# Patient Record
Sex: MALE | Race: WHITE | HISPANIC OR LATINO
[De-identification: names, ages, dates, MRNs, and addresses within clinical notes are randomized per-mention and may not be internally consistent; named-entity substitution may affect disease eponyms.]

---

## 2017-11-08 PROBLEM — Z00.00 ENCOUNTER FOR PREVENTIVE HEALTH EXAMINATION: Status: ACTIVE | Noted: 2017-11-08

## 2022-09-20 ENCOUNTER — APPOINTMENT (OUTPATIENT)
Dept: ORTHOPEDIC SURGERY | Facility: CLINIC | Age: 47
End: 2022-09-20

## 2022-09-20 VITALS — BODY MASS INDEX: 29.99 KG/M2 | WEIGHT: 180 LBS | HEIGHT: 65 IN

## 2022-09-20 DIAGNOSIS — M70.52 OTHER BURSITIS OF KNEE, LEFT KNEE: ICD-10-CM

## 2022-09-20 PROCEDURE — 99204 OFFICE O/P NEW MOD 45 MIN: CPT

## 2022-09-20 NOTE — PHYSICAL EXAM
[5___] : quadriceps 5[unfilled]/5 [Positive] : positive Francis [] : non-antalgic [Left] : left knee [AP] : anteroposterior [Lateral] : lateral [[place]] : skyline [Outside films reviewed] : Outside films reviewed [There are no fractures, subluxations or dislocations. No significant abnormalities are seen] : There are no fractures, subluxations or dislocations. No significant abnormalities are seen

## 2022-09-20 NOTE — HISTORY OF PRESENT ILLNESS
[9] : 9 [0] : 0 [Burning] : burning [Rest] : rest [de-identified] : 46 yo M here for eval of L knee pain for 3 weeks. Pain is only felt while kneeling and is lateral joint line. No injury. Has not tried any tx. [facility] told him no Fx and gave him ibuprophen - did not take. Worse when kneels down [] : no [FreeTextEntry1] : left knee  [FreeTextEntry5] : pt states he has been having left knee pain for about 3 weeks now, pt feels it mainly when kneeling  [de-identified] : kneeling  [de-identified] : 09/16/2022 [de-identified] : urgent care  [de-identified] : XR

## 2022-09-20 NOTE — DISCUSSION/SUMMARY
[de-identified] : ice\par try cushioned knee pad\par OTCs as needed\par \par RE:  CLAUDIA JARA \par \par Acct #- [id number] \par \par Attention:  Nurse Reviewer /Medical Director\par \par  \par Based on my patient's condition, I strongly believe that the MRI L knee is medically.necessary.  \par The patient has failed oral meds, iand conservative treatment in combination or by themselves and therefore needs the MRI.  \par The MRI will dictate further treatment t recommendations.\par \par

## 2022-09-28 ENCOUNTER — APPOINTMENT (OUTPATIENT)
Dept: MRI IMAGING | Facility: CLINIC | Age: 47
End: 2022-09-28

## 2022-10-11 ENCOUNTER — APPOINTMENT (OUTPATIENT)
Dept: ORTHOPEDIC SURGERY | Facility: CLINIC | Age: 47
End: 2022-10-11

## 2025-05-12 ENCOUNTER — INPATIENT (INPATIENT)
Facility: HOSPITAL | Age: 50
LOS: 0 days | Discharge: HOME | End: 2025-05-13
Attending: INTERNAL MEDICINE | Admitting: INTERNAL MEDICINE
Payer: COMMERCIAL

## 2025-05-12 VITALS
WEIGHT: 185.63 LBS | SYSTOLIC BLOOD PRESSURE: 130 MMHG | DIASTOLIC BLOOD PRESSURE: 96 MMHG | OXYGEN SATURATION: 99 % | RESPIRATION RATE: 17 BRPM | HEIGHT: 66 IN | TEMPERATURE: 98 F | HEART RATE: 106 BPM

## 2025-05-12 DIAGNOSIS — I63.9 CEREBRAL INFARCTION, UNSPECIFIED: ICD-10-CM

## 2025-05-12 DIAGNOSIS — R29.810 FACIAL WEAKNESS: ICD-10-CM

## 2025-05-12 DIAGNOSIS — G83.21 MONOPLEGIA OF UPPER LIMB AFFECTING RIGHT DOMINANT SIDE: ICD-10-CM

## 2025-05-12 DIAGNOSIS — R29.700 NIHSS SCORE 0: ICD-10-CM

## 2025-05-12 DIAGNOSIS — H10.13 ACUTE ATOPIC CONJUNCTIVITIS, BILATERAL: ICD-10-CM

## 2025-05-12 DIAGNOSIS — R09.89 OTHER SPECIFIED SYMPTOMS AND SIGNS INVOLVING THE CIRCULATORY AND RESPIRATORY SYSTEMS: ICD-10-CM

## 2025-05-12 DIAGNOSIS — R47.01 APHASIA: ICD-10-CM

## 2025-05-12 LAB
ALBUMIN SERPL ELPH-MCNC: 4.1 G/DL — SIGNIFICANT CHANGE UP (ref 3.3–5)
ALP SERPL-CCNC: 79 U/L — SIGNIFICANT CHANGE UP (ref 40–120)
ALT FLD-CCNC: 44 U/L — SIGNIFICANT CHANGE UP (ref 12–78)
ANION GAP SERPL CALC-SCNC: 5 MMOL/L — SIGNIFICANT CHANGE UP (ref 5–17)
APTT BLD: 29.6 SEC — SIGNIFICANT CHANGE UP (ref 26.1–36.8)
AST SERPL-CCNC: 23 U/L — SIGNIFICANT CHANGE UP (ref 15–37)
BASOPHILS # BLD AUTO: 0.03 K/UL — SIGNIFICANT CHANGE UP (ref 0–0.2)
BASOPHILS NFR BLD AUTO: 0.7 % — SIGNIFICANT CHANGE UP (ref 0–2)
BILIRUB SERPL-MCNC: 0.6 MG/DL — SIGNIFICANT CHANGE UP (ref 0.2–1.2)
BUN SERPL-MCNC: 16 MG/DL — SIGNIFICANT CHANGE UP (ref 7–23)
CALCIUM SERPL-MCNC: 8.8 MG/DL — SIGNIFICANT CHANGE UP (ref 8.5–10.1)
CHLORIDE SERPL-SCNC: 107 MMOL/L — SIGNIFICANT CHANGE UP (ref 96–108)
CO2 SERPL-SCNC: 27 MMOL/L — SIGNIFICANT CHANGE UP (ref 22–31)
CREAT SERPL-MCNC: 1.05 MG/DL — SIGNIFICANT CHANGE UP (ref 0.5–1.3)
EGFR: 87 ML/MIN/1.73M2 — SIGNIFICANT CHANGE UP
EGFR: 87 ML/MIN/1.73M2 — SIGNIFICANT CHANGE UP
EOSINOPHIL # BLD AUTO: 0.03 K/UL — SIGNIFICANT CHANGE UP (ref 0–0.5)
EOSINOPHIL NFR BLD AUTO: 0.7 % — SIGNIFICANT CHANGE UP (ref 0–6)
GLUCOSE SERPL-MCNC: 123 MG/DL — HIGH (ref 70–99)
HCT VFR BLD CALC: 45.4 % — SIGNIFICANT CHANGE UP (ref 39–50)
HGB BLD-MCNC: 14.5 G/DL — SIGNIFICANT CHANGE UP (ref 13–17)
IMM GRANULOCYTES NFR BLD AUTO: 0.2 % — SIGNIFICANT CHANGE UP (ref 0–0.9)
INR BLD: 1.08 RATIO — SIGNIFICANT CHANGE UP (ref 0.85–1.16)
LYMPHOCYTES # BLD AUTO: 1.41 K/UL — SIGNIFICANT CHANGE UP (ref 1–3.3)
LYMPHOCYTES # BLD AUTO: 31.7 % — SIGNIFICANT CHANGE UP (ref 13–44)
MCHC RBC-ENTMCNC: 26.5 PG — LOW (ref 27–34)
MCHC RBC-ENTMCNC: 31.9 G/DL — LOW (ref 32–36)
MCV RBC AUTO: 82.8 FL — SIGNIFICANT CHANGE UP (ref 80–100)
MONOCYTES # BLD AUTO: 0.67 K/UL — SIGNIFICANT CHANGE UP (ref 0–0.9)
MONOCYTES NFR BLD AUTO: 15.1 % — HIGH (ref 2–14)
NEUTROPHILS # BLD AUTO: 2.3 K/UL — SIGNIFICANT CHANGE UP (ref 1.8–7.4)
NEUTROPHILS NFR BLD AUTO: 51.6 % — SIGNIFICANT CHANGE UP (ref 43–77)
NRBC BLD AUTO-RTO: 0 /100 WBCS — SIGNIFICANT CHANGE UP (ref 0–0)
PLATELET # BLD AUTO: 217 K/UL — SIGNIFICANT CHANGE UP (ref 150–400)
POTASSIUM SERPL-MCNC: 4.1 MMOL/L — SIGNIFICANT CHANGE UP (ref 3.5–5.3)
POTASSIUM SERPL-SCNC: 4.1 MMOL/L — SIGNIFICANT CHANGE UP (ref 3.5–5.3)
PROT SERPL-MCNC: 7.7 GM/DL — SIGNIFICANT CHANGE UP (ref 6–8.3)
PROTHROM AB SERPL-ACNC: 12.2 SEC — SIGNIFICANT CHANGE UP (ref 9.9–13.4)
RBC # BLD: 5.48 M/UL — SIGNIFICANT CHANGE UP (ref 4.2–5.8)
RBC # FLD: 15.2 % — HIGH (ref 10.3–14.5)
SODIUM SERPL-SCNC: 139 MMOL/L — SIGNIFICANT CHANGE UP (ref 135–145)
TROPONIN I, HIGH SENSITIVITY RESULT: 16.8 NG/L — SIGNIFICANT CHANGE UP
WBC # BLD: 4.45 K/UL — SIGNIFICANT CHANGE UP (ref 3.8–10.5)
WBC # FLD AUTO: 4.45 K/UL — SIGNIFICANT CHANGE UP (ref 3.8–10.5)

## 2025-05-12 PROCEDURE — 70496 CT ANGIOGRAPHY HEAD: CPT | Mod: 26

## 2025-05-12 PROCEDURE — 93010 ELECTROCARDIOGRAM REPORT: CPT

## 2025-05-12 PROCEDURE — 70498 CT ANGIOGRAPHY NECK: CPT | Mod: 26

## 2025-05-12 PROCEDURE — 99222 1ST HOSP IP/OBS MODERATE 55: CPT

## 2025-05-12 PROCEDURE — 0042T: CPT

## 2025-05-12 PROCEDURE — 70450 CT HEAD/BRAIN W/O DYE: CPT | Mod: 26,59

## 2025-05-12 PROCEDURE — 99285 EMERGENCY DEPT VISIT HI MDM: CPT

## 2025-05-12 PROCEDURE — 99254 IP/OBS CNSLTJ NEW/EST MOD 60: CPT

## 2025-05-12 RX ORDER — MELATONIN 5 MG
3 TABLET ORAL AT BEDTIME
Refills: 0 | Status: DISCONTINUED | OUTPATIENT
Start: 2025-05-12 | End: 2025-05-13

## 2025-05-12 RX ORDER — ASPIRIN 325 MG
81 TABLET ORAL DAILY
Refills: 0 | Status: DISCONTINUED | OUTPATIENT
Start: 2025-05-13 | End: 2025-05-13

## 2025-05-12 RX ORDER — ASPIRIN 325 MG
324 TABLET ORAL ONCE
Refills: 0 | Status: COMPLETED | OUTPATIENT
Start: 2025-05-12 | End: 2025-05-12

## 2025-05-12 RX ORDER — METOCLOPRAMIDE HCL 10 MG
10 TABLET ORAL ONCE
Refills: 0 | Status: COMPLETED | OUTPATIENT
Start: 2025-05-12 | End: 2025-05-12

## 2025-05-12 RX ORDER — ENOXAPARIN SODIUM 100 MG/ML
40 INJECTION SUBCUTANEOUS EVERY 24 HOURS
Refills: 0 | Status: DISCONTINUED | OUTPATIENT
Start: 2025-05-12 | End: 2025-05-13

## 2025-05-12 RX ORDER — ATORVASTATIN CALCIUM 80 MG/1
80 TABLET, FILM COATED ORAL AT BEDTIME
Refills: 0 | Status: DISCONTINUED | OUTPATIENT
Start: 2025-05-12 | End: 2025-05-13

## 2025-05-12 RX ORDER — ACETAMINOPHEN 500 MG/5ML
650 LIQUID (ML) ORAL EVERY 6 HOURS
Refills: 0 | Status: DISCONTINUED | OUTPATIENT
Start: 2025-05-12 | End: 2025-05-13

## 2025-05-12 RX ADMIN — Medication 10 MILLIGRAM(S): at 14:38

## 2025-05-12 RX ADMIN — Medication 1000 MILLILITER(S): at 15:40

## 2025-05-12 RX ADMIN — ATORVASTATIN CALCIUM 80 MILLIGRAM(S): 80 TABLET, FILM COATED ORAL at 21:03

## 2025-05-12 RX ADMIN — ENOXAPARIN SODIUM 40 MILLIGRAM(S): 100 INJECTION SUBCUTANEOUS at 21:03

## 2025-05-12 RX ADMIN — Medication 1000 MILLILITER(S): at 14:07

## 2025-05-12 RX ADMIN — Medication 324 MILLIGRAM(S): at 15:39

## 2025-05-12 NOTE — H&P ADULT - ASSESSMENT
49 years old male with h/o vertigo was brought in to ED with stroke concern. Patient woke up normal today AM and went to work. While at work, patient was confused, does not appear to understand communication at work. Patient complain of right arm weakness, numbness of right face and right arm. EMS noted right facial droop as well. Per wife at bedside, patient had slurred speech.   Hemodynamically stable, afebrile, sat well at RA. EKG with NSR. No leukocytosis, plt 217, Cr 1.05, hsTnT 16.8. CT head with no acute intracranial hemorrhage. CTA with no large vessel occlusion or significant stenosis.

## 2025-05-12 NOTE — ED ADULT TRIAGE NOTE - CHIEF COMPLAINT QUOTE
as per EMs " today outside working at home, sudden onset about 35 mins ago right side facial droop and altered mental status." [ hx of vertigo]

## 2025-05-12 NOTE — ED ADULT NURSE REASSESSMENT NOTE - NS ED NURSE REASSESS COMMENT FT1
1429 stroke code activated on patient BIBA s/p confusion this morning and right side facial droop this morning as per wife. pt is Maced[place] speaking. pt arrived awake. Mild right side facial droop noted. pt following simple command. MD at bedside for patient assessment. ivr placed by medic in left a/c, patent.  lab specimen obtained, results pending.     1449 pt escorted via stretcher to CT scan by this writer/RN and tech. Pt on portable cardiac monitor. Pt is awake, no labor breathing noted. pt able to move from stretcher to ct scan bed with no dificulty nor assist. pt continues to follow simple commands during scan session. pt remains in no distress. will continue to monitor.
pt resting comfortably in bed on cardiac monitor. pt denies any pain. pt awaiting ready bed on assigned unit.

## 2025-05-12 NOTE — ED PROVIDER NOTE - PHYSICAL EXAMINATION
General appearance: Nontoxic appearing, conversant, afebrile    Eyes: anicteric sclerae, MARYANNE, EOMI   HENT: Atraumatic; oropharynx clear, MMM and no ulcerations, no pharyngeal erythema or exudate   Neck: Trachea midline; Full range of motion, supple   Pulm: CTA bl, normal respiratory effort and no intercostal retractions, normal work of breathing   CV: RRR, No murmurs, rubs, or gallops   Abdomen: Soft, non-tender, non-distended; no guarding or rebound   Extremities: No peripheral edema, no gross deformities, FROM x4, 5/5 MS x4, gross sensation intact    Skin: Dry, normal temperature, turgor and texture; no rash   Psych: Appropriate affect, cooperative

## 2025-05-12 NOTE — H&P ADULT - NSHPPHYSICALEXAM_GEN_ALL_CORE
CONSTITUTIONAL: alert and cooperative, no acute distress.  EYES: PERRL, EOMI  ENT: Mucosa moist, tongue normal  NECK: Neck supple, trachea midline, non-tender  CARDIAC: Normal S1 and S2. Regular rate and rhythms. No Pedal edema  LUNGS: Equal air entry both lungs. No rales, rhonchi, wheezing. Normal respiratory effort.   ABDOMEN: Soft, nondistended, nontender. No guarding or rebound tenderness. No hepatomegaly or splenomegaly. Bowel sound normal.  MUSCULOSKELETAL: Normocephalic, atraumatic. No significant deformity or joint abnormality  NEUROLOGICAL: No gross motor deficits. Slight decrease sensation on right face, right arm and slight on right lower extremity as compared to right. No dysmetria. Visual field intact  SKIN: no lesions or eruptions. Normal turgor  PSYCHIATRIC: A&O x 3, appropriate mood and affect.

## 2025-05-12 NOTE — H&P ADULT - PROBLEM SELECTOR PLAN 1
present with transient slurred speech, facial droop, right arm weakness and numbness. Slurred speech, facial droop and right arm weakness resolved. Still have slight decrease sensation in right face, right arm and slightly less in right extremity.  CT head  ( I personally review) with no acute intracranial hemorrhage. CTA with no large vessel occlusion or significant stenosis  received aspirin. Continue with aspirin, high intensity statin  Check MRI brain  neuro check, telemetry monitoring, lipid panel, A1c, PT consult  Permissive HTN for 24 hours from symptoms onset  episode of confusion and now with slight hand tremors---> check EEG   ED consulted neurology

## 2025-05-12 NOTE — H&P ADULT - NSHPREVIEWOFSYSTEMS_GEN_ALL_CORE
All ROS were negative except transient slurred speech, right arm weakness, right face and right arm numbness

## 2025-05-12 NOTE — CONSULT NOTE ADULT - SUBJECTIVE AND OBJECTIVE BOX
BIBEMS from home early this afternoon.  History from ED Provider Note    <Start of quote(s) from ED Provider Note>  "50yo male with no reported pmh presenting with dizziness and right sided weakness.  Here with wife.  This morning left for work around 7am.  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  Patient now reports no symptoms but slow to respond, seems to have some difficulty following commands and speaking full sentences.  Wife notices he is intermittently speaking fluently but not consistently.   Over past few days, he has been having vertigo type symptoms of n/v, dizziness, and headache and was prescribed meclizine by June but it has been making him drowsy so he has not been taking it.  NIHSS currently 0.  Will need labs and cts eval stroke/ tia.  Is reporting headache, will treat and reassess."  <End of quote(s) from ED Provider Note>    ADDITIONAL Hx OBTAINED FROM MY INTERVIEWING THE PATIENT'S WIFE.    Pt's coworker(s) called the wife to report the difficulties they noticed.  She asked he be brought home; she went home to meet him.  When she asked him questions he responded with incomprehensible stammering.  EMS was called.  Since arriving here his speech has returned, though not completely.  Facial droop has nearly resolved.  Limb weakness apparently resolved.    Per radiology report of CT head, CTP brain, and CTAs of head and neck:  "COMPARISON: None.   . . .     FINDINGS:    CT BRAIN:    VENTRICLES AND SULCI: Normal in size and configuration.  INTRA-AXIAL: No intraparenchymal mass, acute hemorrhage, or midline shift.  EXTRA-AXIAL: No mass or fluid collection. Basal cisterns are normal in   appearance.    VISUALIZED SINUSES:  Clear.  TYMPANOMASTOID CAVITIES:  Clear.  VISUALIZED ORBITS: Normal.  CALVARIUM: Intact.    MISCELLANEOUS: None.      CT PERFUSION:  Few patchy areas of delayed transit which are nonspecific and may be   artifactual. No territorial perfusionabnormality.      CTA NECK:    AORTIC ARCH AND VISUALIZED GREAT VESSELS: Within normal limits.    RIGHT:  COMMON CAROTID ARTERY: No significant stenosis to the carotid bifurcation.  INTERNAL CAROTID ARTERY: No significant stenosis based on NASCET criteria.  VERTEBRAL ARTERY: Normal in course and caliber to the intracranial   circulation.    LEFT:  COMMON CAROTID ARTERY: No significant stenosis to the carotid bifurcation.  INTERNAL CAROTID ARTERY: No significant stenosis based on NASCET criteria.  VERTEBRAL ARTERY: Normal in course and caliber to the intracranial   circulation.    VISUALIZED LUNGS: Clear.    MISCELLANEOUS: None.    CAROTID STENOSIS REFERENCE: Percent (%) stenosis is expressed in terms of   NASCET Criteria. (NASCET = 100x1-(N/D)). N=greatest narrowing. D=normal   distal diameter - MILD = <50% stenosis. - MODERATE = 50-69% stenosis. -   SEVERE = 70-89% stenosis. - HAIRLINE/CRITICAL = 90-99% stenosis. -   OCCLUDED = 100% stenosis.      CTA HEAD:    INTERNAL CAROTID ARTERIES: Bilateral petrous, precavernous, cavernous,   and supraclinoid regions are patent without significant stenosis.    [place]: No aneurysm identified. Tiny aneurysms can be beyond   the resolution of CTA technique.    ANTERIOR CEREBRAL ARTERIES:No significant stenosis or occlusion.  MIDDLE CEREBRAL ARTERIES: No significant stenosis or occlusion.  POSTERIOR CEREBRAL ARTERIES: No significant stenosis or occlusion.    DISTAL VERTEBRAL / BASILAR ARTERIES: No significant stenosis or occlusion.    VENOUS STRUCTURES: Visualized superficial and deep venous structures   appear patent.    MISCELLANEOUS: No other vascular abnormality is seen.      IMPRESSION:    CT HEAD:  Gray/white matter differentiation is preserved. No acute intracranial   hemorrhage.    Findings were discussed with Dr. STERLING MCARTHUR [id number]   5/12/2025 1:55 PM by Dr. Manriquez with read back confirmation.    CT PERFUSION:  No territorial perfusion abnormality.    CTA NECK:  No evidence of significant stenosis orocclusion.    CTA HEAD:  No large vessel occlusion, significant stenosis or vascular abnormality   identified."        EXAMINATION    Awake.  Blank stare. Moderate psychomotor slowing.   BIBEMS from home early this afternoon.  History from ED Provider Note    <Start of quote(s) from ED Provider Note>  "48yo male with no reported pmh presenting with dizziness and right sided weakness.  Here with wife.  This morning left for work around 7am.  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  Patient now reports no symptoms but slow to respond, seems to have some difficulty following commands and speaking full sentences.  Wife notices he is intermittently speaking fluently but not consistently.   Over past few days, he has been having vertigo type symptoms of n/v, dizziness, and headache and was prescribed meclizine by June but it has been making him drowsy so he has not been taking it.  NIHSS currently 0.  Will need labs and cts eval stroke/ tia.  Is reporting headache, will treat and reassess."  <End of quote(s) from ED Provider Note>    ADDITIONAL Hx OBTAINED FROM MY INTERVIEWING THE PATIENT'S WIFE (by me, in Faroese and English)    Pt's coworker(s) called the wife to report the difficulties they noticed.  She asked he be brought home; she went home to meet him.  When she asked him questions he responded with incomprehensible stammering.  EMS was called.  Since arriving here his speech has returned, though not completely.  Facial droop has nearly resolved.  Limb weakness apparently resolved.    Per radiology report of CT head, CTP brain, and CTAs of head and neck:  "COMPARISON: None.   . . .     FINDINGS:    CT BRAIN:    VENTRICLES AND SULCI: Normal in size and configuration.  INTRA-AXIAL: No intraparenchymal mass, acute hemorrhage, or midline shift.  EXTRA-AXIAL: No mass or fluid collection. Basal cisterns are normal in   appearance.    VISUALIZED SINUSES:  Clear.  TYMPANOMASTOID CAVITIES:  Clear.  VISUALIZED ORBITS: Normal.  CALVARIUM: Intact.    MISCELLANEOUS: None.      CT PERFUSION:  Few patchy areas of delayed transit which are nonspecific and may be   artifactual. No territorial perfusionabnormality.      CTA NECK:    AORTIC ARCH AND VISUALIZED GREAT VESSELS: Within normal limits.    RIGHT:  COMMON CAROTID ARTERY: No significant stenosis to the carotid bifurcation.  INTERNAL CAROTID ARTERY: No significant stenosis based on NASCET criteria.  VERTEBRAL ARTERY: Normal in course and caliber to the intracranial   circulation.    LEFT:  COMMON CAROTID ARTERY: No significant stenosis to the carotid bifurcation.  INTERNAL CAROTID ARTERY: No significant stenosis based on NASCET criteria.  VERTEBRAL ARTERY: Normal in course and caliber to the intracranial   circulation.    VISUALIZED LUNGS: Clear.    MISCELLANEOUS: None.    CAROTID STENOSIS REFERENCE: Percent (%) stenosis is expressed in terms of   NASCET Criteria. (NASCET = 100x1-(N/D)). N=greatest narrowing. D=normal   distal diameter - MILD = <50% stenosis. - MODERATE = 50-69% stenosis. -   SEVERE = 70-89% stenosis. - HAIRLINE/CRITICAL = 90-99% stenosis. -   OCCLUDED = 100% stenosis.      CTA HEAD:    INTERNAL CAROTID ARTERIES: Bilateral petrous, precavernous, cavernous,   and supraclinoid regions are patent without significant stenosis.    [place] No aneurysm identified. Tiny aneurysms can be beyond   the resolution of CTA technique.    ANTERIOR CEREBRAL ARTERIES:No significant stenosis or occlusion.  MIDDLE CEREBRAL ARTERIES: No significant stenosis or occlusion.  POSTERIOR CEREBRAL ARTERIES: No significant stenosis or occlusion.    DISTAL VERTEBRAL / BASILAR ARTERIES: No significant stenosis or occlusion.    VENOUS STRUCTURES: Visualized superficial and deep venous structures   appear patent.    MISCELLANEOUS: No other vascular abnormality is seen.      IMPRESSION:    CT HEAD:  Gray/white matter differentiation is preserved. No acute intracranial   hemorrhage.    Findings were discussed with Dr. STERLING MCARTHUR [id number]   5/12/2025 1:55 PM by Dr. Manriquez with read back confirmation.    CT PERFUSION:  No territorial perfusion abnormality.    CTA NECK:  No evidence of significant stenosis orocclusion.    CTA HEAD:  No large vessel occlusion, significant stenosis or vascular abnormality   identified."        EXAMINATION    Pt interviewed by me in Faroese.  Awake.  Blank stare. Moderate psychomotor slowing.   BIBEMS from home early this afternoon.  History from ED Provider Note    <Start of quote(s) from ED Provider Note>  "48yo male with no reported pmh presenting with dizziness and right sided weakness.  Here with wife.  This morning left for work around 7am.  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  Patient now reports no symptoms but slow to respond, seems to have some difficulty following commands and speaking full sentences.  Wife notices he is intermittently speaking fluently but not consistently.   Over past few days, he has been having vertigo type symptoms of n/v, dizziness, and headache and was prescribed meclizine by June but it has been making him drowsy so he has not been taking it.  NIHSS currently 0.  Will need labs and cts eval stroke/ tia.  Is reporting headache, will treat and reassess."  <End of quote(s) from ED Provider Note>    ADDITIONAL Hx OBTAINED FROM MY INTERVIEWING THE PATIENT'S WIFE (by me, in Maldivian and English)    Pt's coworker(s) called the wife to report the difficulties they noticed.  She asked he be brought home; she went home to meet him.  When she asked him questions he responded with incomprehensible stammering.  EMS was called.  Since arriving here his speech has returned, though not completely.  Facial droop has nearly resolved.  Limb weakness apparently resolved.    PER MY INTERVIEWING THE PATIENT (in Maldivian)    He responds that at the outset of his symptoms he could not formulate in his mind what he wanted to say.           Per radiology report of CT head, CTP brain, and CTAs of head and neck:  "COMPARISON: None.   . . .     FINDINGS:    CT BRAIN:    VENTRICLES AND SULCI: Normal in size and configuration.  INTRA-AXIAL: No intraparenchymal mass, acute hemorrhage, or midline shift.  EXTRA-AXIAL: No mass or fluid collection. Basal cisterns are normal in   appearance.    VISUALIZED SINUSES:  Clear.  TYMPANOMASTOID CAVITIES:  Clear.  VISUALIZED ORBITS: Normal.  CALVARIUM: Intact.    MISCELLANEOUS: None.      CT PERFUSION:  Few patchy areas of delayed transit which are nonspecific and may be   artifactual. No territorial perfusionabnormality.      CTA NECK:    AORTIC ARCH AND VISUALIZED GREAT VESSELS: Within normal limits.    RIGHT:  COMMON CAROTID ARTERY: No significant stenosis to the carotid bifurcation.  INTERNAL CAROTID ARTERY: No significant stenosis based on NASCET criteria.  VERTEBRAL ARTERY: Normal in course and caliber to the intracranial   circulation.    LEFT:  COMMON CAROTID ARTERY: No significant stenosis to the carotid bifurcation.  INTERNAL CAROTID ARTERY: No significant stenosis based on NASCET criteria.  VERTEBRAL ARTERY: Normal in course and caliber to the intracranial   circulation.    VISUALIZED LUNGS: Clear.    MISCELLANEOUS: None.    CAROTID STENOSIS REFERENCE: Percent (%) stenosis is expressed in terms of   NASCET Criteria. (NASCET = 100x1-(N/D)). N=greatest narrowing. D=normal   distal diameter - MILD = <50% stenosis. - MODERATE = 50-69% stenosis. -   SEVERE = 70-89% stenosis. - HAIRLINE/CRITICAL = 90-99% stenosis. -   OCCLUDED = 100% stenosis.      CTA HEAD:    INTERNAL CAROTID ARTERIES: Bilateral petrous, precavernous, cavernous,   and supraclinoid regions are patent without significant stenosis.    [place] No aneurysm identified. Tiny aneurysms can be beyond   the resolution of CTA technique.    ANTERIOR CEREBRAL ARTERIES:No significant stenosis or occlusion.  MIDDLE CEREBRAL ARTERIES: No significant stenosis or occlusion.  POSTERIOR CEREBRAL ARTERIES: No significant stenosis or occlusion.    DISTAL VERTEBRAL / BASILAR ARTERIES: No significant stenosis or occlusion.    VENOUS STRUCTURES: Visualized superficial and deep venous structures   appear patent.    MISCELLANEOUS: No other vascular abnormality is seen.      IMPRESSION:    CT HEAD:  Gray/white matter differentiation is preserved. No acute intracranial   hemorrhage.    Findings were discussed with Dr. STERLING MCARTHUR [id number]   5/12/2025 1:55 PM by Dr. Manriquez with read back confirmation.    CT PERFUSION:  No territorial perfusion abnormality.    CTA NECK:  No evidence of significant stenosis orocclusion.    CTA HEAD:  No large vessel occlusion, significant stenosis or vascular abnormality   identified."        EXAMINATION    Pt interviewed by me in Maldivian.  Muscular build.  Awake.  Blank stare. Mild to moderate psychomotor slowing.  Gave date (piecemeal) as Monday (correct), May 12, 2025.  Does not name fingers correctly or does not name them at all.  When asked to show his thumb (pulgar) and little kelsey (me~nique) he did so.  Crossed the medline.  No R/L confusion.  PERRL; confrontation visual fields grossly intact; EOMI.  Subtle UMN-type R facial weakness.      No UE drift (by multiple methods).  Ingrid of hands normal and symmetric.      Normal symmetric limb muscle strength commensurate with body habitus on confrontation testing of strength.      P and LT sensation grossly intact; no extinction on DSS.  Did not detect very light touch in the hands (?consequence of strong physical labor).      Reflex                           Right    Left   Comment    Biceps                            1-        1-  Triceps                       insufficient relaxation  Patellar                          2-       2-  Gastroc                       insufficient relaxation   Plantar                        flexor   flexor                                                                                                        BIBEMS from home early this afternoon.  History from ED Provider Note    <Start of quote(s) from ED Provider Note>  "50yo male with no reported pmh presenting with dizziness and right sided weakness.  Here with wife.  This morning left for work around 7am.  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  Patient now reports no symptoms but slow to respond, seems to have some difficulty following commands and speaking full sentences.  Wife notices he is intermittently speaking fluently but not consistently.   Over past few days, he has been having vertigo type symptoms of n/v, dizziness, and headache and was prescribed meclizine by June but it has been making him drowsy so he has not been taking it.  NIHSS currently 0.  Will need labs and cts eval stroke/ tia.  Is reporting headache, will treat and reassess."  <End of quote(s) from ED Provider Note>    ADDITIONAL Hx OBTAINED FROM MY INTERVIEWING THE PATIENT'S WIFE (by me, in [place] and English)    Pt's coworker(s) called the wife to report the difficulties they noticed.  She asked he be brought home; she went home to meet him.  When she asked him questions he responded with incomprehensible stammering.  EMS was called.  Since arriving here his speech has returned, though not completely.  Facial droop has nearly resolved.  Limb weakness apparently resolved.    PER MY INTERVIEWING THE PATIENT (in [place])    He responds that at the outset of his symptoms he could not formulate in his mind what he wanted to say.           Per radiology report of CT head, CTP brain, and CTAs of head and neck:  "COMPARISON: None.   . . .     FINDINGS:    CT BRAIN:    VENTRICLES AND SULCI: Normal in size and configuration.  INTRA-AXIAL: No intraparenchymal mass, acute hemorrhage, or midline shift.  EXTRA-AXIAL: No mass or fluid collection. Basal cisterns are normal in   appearance.    VISUALIZED SINUSES:  Clear.  TYMPANOMASTOID CAVITIES:  Clear.  VISUALIZED ORBITS: Normal.  CALVARIUM: Intact.    MISCELLANEOUS: None.      CT PERFUSION:  Few patchy areas of delayed transit which are nonspecific and may be   artifactual. No territorial perfusionabnormality.      CTA NECK:    AORTIC ARCH AND VISUALIZED GREAT VESSELS: Within normal limits.    RIGHT:  COMMON CAROTID ARTERY: No significant stenosis to the carotid bifurcation.  INTERNAL CAROTID ARTERY: No significant stenosis based on NASCET criteria.  VERTEBRAL ARTERY: Normal in course and caliber to the intracranial   circulation.    LEFT:  COMMON CAROTID ARTERY: No significant stenosis to the carotid bifurcation.  INTERNAL CAROTID ARTERY: No significant stenosis based on NASCET criteria.  VERTEBRAL ARTERY: Normal in course and caliber to the intracranial   circulation.    VISUALIZED LUNGS: Clear.    MISCELLANEOUS: None.    CAROTID STENOSIS REFERENCE: Percent (%) stenosis is expressed in terms of   NASCET Criteria. (NASCET = 100x1-(N/D)). N=greatest narrowing. D=normal   distal diameter - MILD = <50% stenosis. - MODERATE = 50-69% stenosis. -   SEVERE = 70-89% stenosis. - HAIRLINE/CRITICAL = 90-99% stenosis. -   OCCLUDED = 100% stenosis.      CTA HEAD:    INTERNAL CAROTID ARTERIES: Bilateral petrous, precavernous, cavernous,   and supraclinoid regions are patent without significant stenosis.    [place]: No aneurysm identified. Tiny aneurysms can be beyond   the resolution of CTA technique.    ANTERIOR CEREBRAL ARTERIES:No significant stenosis or occlusion.  MIDDLE CEREBRAL ARTERIES: No significant stenosis or occlusion.  POSTERIOR CEREBRAL ARTERIES: No significant stenosis or occlusion.    DISTAL VERTEBRAL / BASILAR ARTERIES: No significant stenosis or occlusion.    VENOUS STRUCTURES: Visualized superficial and deep venous structures   appear patent.    MISCELLANEOUS: No other vascular abnormality is seen.      IMPRESSION:    CT HEAD:  Gray/white matter differentiation is preserved. No acute intracranial   hemorrhage.    Findings were discussed with Dr. STERLING MCARTHUR [id number]   5/12/2025 1:55 PM by Dr. Manriquez with read back confirmation.    CT PERFUSION:  No territorial perfusion abnormality.    CTA NECK:  No evidence of significant stenosis orocclusion.    CTA HEAD:  No large vessel occlusion, significant stenosis or vascular abnormality   identified."        EXAMINATION    Pt interviewed by me in [place].  Muscular build.  Awake.  Blank stare. Mild to moderate psychomotor slowing.  Gave date (piecemeal) as Monday (correct), May 12, 2025.  Does not name fingers correctly or does not name them at all.  When asked to show his thumb (pulgar) and little kelsey (me~nique) he did so.  Crossed the medline.  No R/L confusion.  PERRL; confrontation visual fields grossly intact; EOMI.  Subtle UMN-type R facial weakness.      No UE drift (by multiple methods).  Ingrid of hands normal and symmetric.      Normal symmetric limb muscle strength commensurate with body habitus on confrontation testing of strength.      P and LT sensation grossly intact; no extinction on DSS.  Did not detect very light touch in the hands (?consequence of strong physical labor).      Reflex                           Right    Left   Comment    Biceps                            1-        1-  Triceps                       insufficient relaxation  Patellar                          2-       2-  Gastroc                       insufficient relaxation   Plantar                        flexor   flexor

## 2025-05-12 NOTE — H&P ADULT - HISTORY OF PRESENT ILLNESS
Wife Kendra ( [phone]) translate for patient at bedside  49 years old male with h/o vertigo was brought in to ED with stroke concern. Patient woke up normal today AM and went to work. While at work, patient was confused, does not appear to understand communication at work. Patient complain of right arm weakness, numbness of right face and right arm. EMS noted right facial droop as well. Per wife at bedside, patient had slurred speech.   Hemodynamically stable, afebrile, sat well at RA. EKG with NSR. No leukocytosis, plt 217, Cr 1.05, hsTnT 16.8. CT head with no acute intracranial hemorrhage. CTA with no large vessel occlusion or significant stenosis.    SH: no toxic habits

## 2025-05-12 NOTE — ED PROVIDER NOTE - CLINICAL SUMMARY MEDICAL DECISION MAKING FREE TEXT BOX
50yo male with no reported pmh presenting with dizziness and right sided weakness.  Here with wife.  This morning left for work around 7am.  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  Patient now reports no symptoms but slow to respond, seems to have some difficulty following commands.  Over past few days, he has been having vertigo type symptoms of n/v, dizziness, and headache and was prescribed meclizine by June but it has been making him drowsy so he has not been taking it.  NIHSS currently 0.  Will need labs and cts eval stroke/ tia.  Is reporting headache, will treat and reassess. 48yo male with no reported pmh presenting with dizziness and right sided weakness.  Here with wife.  This morning left for work around 7am.  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  Patient now reports no symptoms but slow to respond, seems to have some difficulty following commands and speaking full sentences.  Wife notices he is intermittently speaking fluently but not consistently.   Over past few days, he has been having vertigo type symptoms of n/v, dizziness, and headache and was prescribed meclizine by June but it has been making him drowsy so he has not been taking it.  NIHSS currently 0.  Will need labs and cts eval stroke/ tia.  Is reporting headache, will treat and reassess.

## 2025-05-12 NOTE — PHARMACOTHERAPY INTERVENTION NOTE - COMMENTS
Code Stroke called for patient complaining of altered mental status and right-sided facial droop with ongoing vertigo and nausea x2days, last known normal per family at bedside was today 5/12 ~7:00am. Pharmacy at bedside with ED team to assess patient.     Tenecteplase not indicated at this moment as patient is out of administration window (>4.5 hrs).

## 2025-05-12 NOTE — CONSULT NOTE ADULT - ASSESSMENT
Acute ischemic stroke with receptive and expressive aphasia, R facial weakness, and RUE weakness at outset, with significant degree of partial recovery.  In particular, no residual limb weakness detectable at this time.   Likely L MCA ischemic infarct.      RECOMMENDATIONS    Non-con MR head.      TTE with bubble study.    Cardiac telemetry.      Continue ASA 81mg daily for now.    Continue atorvastatin 80mg daily for now.      If work-up discloses an indication for anticoagulation for stroke prevention, and the decision is made to anticoagulate, then antiplatelet agents are to be D/C'ed if being given for secondary stroke prevention.    ESR, CRP, RPR, DEIRDRE, HgbA1c, hypercoagulation panel, methylmalonic acid, RF, SPEP.                                                             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        Patients requiring neurological follow-up after discharge may contact one of the following offices.     [facility] [place]  [address] [place] [address]  [phone]    [facility]  [address].  [place], [place]  [phone]    Komal Lazaro M.D.   - Department of Neurology  [employer] [facility]   Acute ischemic stroke with receptive and expressive aphasia, R facial weakness, and RUE weakness at outset, with significant degree of partial recovery.    Subtle residual R facial weakness.  Speech somewhat slow.  Psychomotor slowing.  No residual limb weakness detectable at this time.       Likely L MCA ischemic infarct.        RECOMMENDATIONS    Non-con MR head.      TTE with bubble study.    Cardiac telemetry.      Continue ASA 81mg daily for now.    Continue atorvastatin 80mg daily for now.      If work-up discloses an indication for anticoagulation for stroke prevention, and the decision is made to anticoagulate, then antiplatelet agents are to be D/C'ed if being given for secondary stroke prevention.    ESR, CRP, RPR, DEIRDRE, HgbA1c, hypercoagulation panel, methylmalonic acid, RF, SPEP.                                                             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        Patients requiring neurological follow-up after discharge may contact one of the following offices.     [facility]  [address] [place] [address].  [place] [address]  [phone]    [facility]  [address]  [place], [place]  [phone]    Komal Lazaro M.D.   - Department of Neurology  [employer] [facility] [place]

## 2025-05-13 ENCOUNTER — TRANSCRIPTION ENCOUNTER (OUTPATIENT)
Age: 50
End: 2025-05-13

## 2025-05-13 VITALS
TEMPERATURE: 98 F | SYSTOLIC BLOOD PRESSURE: 126 MMHG | OXYGEN SATURATION: 95 % | DIASTOLIC BLOOD PRESSURE: 81 MMHG | RESPIRATION RATE: 18 BRPM | HEART RATE: 91 BPM

## 2025-05-13 DIAGNOSIS — H10.13 ACUTE ATOPIC CONJUNCTIVITIS, BILATERAL: ICD-10-CM

## 2025-05-13 LAB
A1C WITH ESTIMATED AVERAGE GLUCOSE RESULT: 5.6 % — SIGNIFICANT CHANGE UP (ref 4–5.6)
ALBUMIN SERPL ELPH-MCNC: 3.6 G/DL — SIGNIFICANT CHANGE UP (ref 3.3–5)
ALP SERPL-CCNC: 69 U/L — SIGNIFICANT CHANGE UP (ref 40–120)
ALT FLD-CCNC: 34 U/L — SIGNIFICANT CHANGE UP (ref 12–78)
ANION GAP SERPL CALC-SCNC: 4 MMOL/L — LOW (ref 5–17)
AST SERPL-CCNC: 17 U/L — SIGNIFICANT CHANGE UP (ref 15–37)
BILIRUB SERPL-MCNC: 0.9 MG/DL — SIGNIFICANT CHANGE UP (ref 0.2–1.2)
BUN SERPL-MCNC: 15 MG/DL — SIGNIFICANT CHANGE UP (ref 7–23)
CALCIUM SERPL-MCNC: 8.6 MG/DL — SIGNIFICANT CHANGE UP (ref 8.5–10.1)
CHLORIDE SERPL-SCNC: 107 MMOL/L — SIGNIFICANT CHANGE UP (ref 96–108)
CHOLEST SERPL-MCNC: 120 MG/DL — SIGNIFICANT CHANGE UP
CO2 SERPL-SCNC: 28 MMOL/L — SIGNIFICANT CHANGE UP (ref 22–31)
CREAT SERPL-MCNC: 1.01 MG/DL — SIGNIFICANT CHANGE UP (ref 0.5–1.3)
EGFR: 91 ML/MIN/1.73M2 — SIGNIFICANT CHANGE UP
EGFR: 91 ML/MIN/1.73M2 — SIGNIFICANT CHANGE UP
ESTIMATED AVERAGE GLUCOSE: 114 MG/DL — SIGNIFICANT CHANGE UP (ref 68–114)
GLUCOSE SERPL-MCNC: 108 MG/DL — HIGH (ref 70–99)
HCT VFR BLD CALC: 43 % — SIGNIFICANT CHANGE UP (ref 39–50)
HDLC SERPL-MCNC: 12 MG/DL — LOW
HGB BLD-MCNC: 13.5 G/DL — SIGNIFICANT CHANGE UP (ref 13–17)
LDLC SERPL-MCNC: 73 MG/DL — SIGNIFICANT CHANGE UP
LIPID PNL WITH DIRECT LDL SERPL: 73 MG/DL — SIGNIFICANT CHANGE UP
MAGNESIUM SERPL-MCNC: 2.2 MG/DL — SIGNIFICANT CHANGE UP (ref 1.6–2.6)
MCHC RBC-ENTMCNC: 26 PG — LOW (ref 27–34)
MCHC RBC-ENTMCNC: 31.4 G/DL — LOW (ref 32–36)
MCV RBC AUTO: 82.9 FL — SIGNIFICANT CHANGE UP (ref 80–100)
NONHDLC SERPL-MCNC: 108 MG/DL — SIGNIFICANT CHANGE UP
NRBC BLD AUTO-RTO: 0 /100 WBCS — SIGNIFICANT CHANGE UP (ref 0–0)
PHOSPHATE SERPL-MCNC: 2.8 MG/DL — SIGNIFICANT CHANGE UP (ref 2.5–4.5)
PLATELET # BLD AUTO: 197 K/UL — SIGNIFICANT CHANGE UP (ref 150–400)
POTASSIUM SERPL-MCNC: 4.1 MMOL/L — SIGNIFICANT CHANGE UP (ref 3.5–5.3)
POTASSIUM SERPL-SCNC: 4.1 MMOL/L — SIGNIFICANT CHANGE UP (ref 3.5–5.3)
PROT SERPL-MCNC: 6.8 GM/DL — SIGNIFICANT CHANGE UP (ref 6–8.3)
RBC # BLD: 5.19 M/UL — SIGNIFICANT CHANGE UP (ref 4.2–5.8)
RBC # FLD: 15.4 % — HIGH (ref 10.3–14.5)
SODIUM SERPL-SCNC: 139 MMOL/L — SIGNIFICANT CHANGE UP (ref 135–145)
TRIGL SERPL-MCNC: 203 MG/DL — HIGH
WBC # BLD: 4.4 K/UL — SIGNIFICANT CHANGE UP (ref 3.8–10.5)
WBC # FLD AUTO: 4.4 K/UL — SIGNIFICANT CHANGE UP (ref 3.8–10.5)

## 2025-05-13 PROCEDURE — 70551 MRI BRAIN STEM W/O DYE: CPT | Mod: 26

## 2025-05-13 PROCEDURE — 99232 SBSQ HOSP IP/OBS MODERATE 35: CPT

## 2025-05-13 PROCEDURE — 95816 EEG AWAKE AND DROWSY: CPT | Mod: 26

## 2025-05-13 PROCEDURE — 99239 HOSP IP/OBS DSCHRG MGMT >30: CPT

## 2025-05-13 RX ORDER — CLOPIDOGREL BISULFATE 75 MG/1
75 TABLET, FILM COATED ORAL DAILY
Refills: 0 | Status: DISCONTINUED | OUTPATIENT
Start: 2025-05-13 | End: 2025-05-13

## 2025-05-13 RX ORDER — ATORVASTATIN CALCIUM 80 MG/1
1 TABLET, FILM COATED ORAL
Qty: 30 | Refills: 0
Start: 2025-05-13 | End: 2025-06-11

## 2025-05-13 RX ORDER — KETOTIFEN FUMARATE 0.35 MG/ML
1 SOLUTION/ DROPS OPHTHALMIC
Refills: 0 | Status: DISCONTINUED | OUTPATIENT
Start: 2025-05-13 | End: 2025-05-13

## 2025-05-13 RX ORDER — ASPIRIN 325 MG
1 TABLET ORAL
Qty: 30 | Refills: 0
Start: 2025-05-13 | End: 2025-06-11

## 2025-05-13 RX ORDER — CLOPIDOGREL BISULFATE 75 MG/1
1 TABLET, FILM COATED ORAL
Qty: 21 | Refills: 0
Start: 2025-05-13 | End: 2025-06-02

## 2025-05-13 RX ORDER — KETOTIFEN FUMARATE 0.35 MG/ML
1 SOLUTION/ DROPS OPHTHALMIC
Qty: 1 | Refills: 0
Start: 2025-05-13 | End: 2025-06-11

## 2025-05-13 RX ADMIN — Medication 81 MILLIGRAM(S): at 12:28

## 2025-05-13 RX ADMIN — Medication 10 MILLIGRAM(S): at 12:29

## 2025-05-13 RX ADMIN — KETOTIFEN FUMARATE 1 DROP(S): 0.35 SOLUTION/ DROPS OPHTHALMIC at 19:20

## 2025-05-13 NOTE — OCCUPATIONAL THERAPY INITIAL EVALUATION ADULT - PERTINENT HX OF CURRENT PROBLEM, REHAB EVAL
As per H&P; 49 years old male with h/o vertigo was brought in to ED with stroke concern. Patient woke up normal today AM and went to work. While at work, patient was confused, does not appear to understand communication at work. Patient complain of right arm weakness, numbness of right face and right arm. EMS noted right facial droop as well. Per wife at bedside, patient had slurred speech.   Hemodynamically stable, afebrile, sat well at RA. EKG with NSR. No leukocytosis, plt 217, Cr 1.05, hsTnT 16.8. CT head with no acute intracranial hemorrhage. CTA with no large vessel occlusion or significant stenosis. Per neurology: "Acute ischemic stroke with receptive and expressive aphasia, R facial weakness, and RUE weakness at outset, with significant degree of partial recovery. Likely L MCA infarct."

## 2025-05-13 NOTE — PHYSICAL THERAPY INITIAL EVALUATION ADULT - PERTINENT HX OF CURRENT PROBLEM, REHAB EVAL
Patient admitted with dizziness and R sided weakness. CT head negative and MR brain negative. Per neurology: "Acute ischemic stroke with receptive and expressive aphasia, R facial weakness, and RUE weakness at outset, with significant degree of partial recovery. Likely L MCA infarct."

## 2025-05-13 NOTE — DISCHARGE NOTE PROVIDER - NSDCMRMEDTOKEN_GEN_ALL_CORE_FT
aspirin 81 mg oral delayed release tablet: 1 tab(s) orally once a day  cetirizine 10 mg oral tablet: 1 tab(s) orally once a day  ketotifen 0.025% ophthalmic solution: 1 drop(s) to each affected eye 2 times a day  Lipitor 40 mg oral tablet: 1 tab(s) orally  Plavix 75 mg oral tablet: 1 tab(s) orally once a day   aspirin 81 mg oral delayed release tablet: 1 tab(s) orally once a day  cetirizine 10 mg oral tablet: 1 tab(s) orally once a day  ketotifen 0.025% ophthalmic solution: 1 drop(s) to each affected eye 2 times a day  Lipitor 40 mg oral tablet: 1 tab(s) orally once a day (at bedtime)  Plavix 75 mg oral tablet: 1 tab(s) orally once a day

## 2025-05-13 NOTE — DISCHARGE NOTE NURSING/CASE MANAGEMENT/SOCIAL WORK - PATIENT PORTAL LINK FT
You can access the FollowMyHealth Patient Portal offered by [facility] by registering at the following website: [email or web address]. By joining [facility]’s FollowMyHealth portal, you will also be able to view your health information using other applications (apps) compatible with our system.

## 2025-05-13 NOTE — DISCHARGE NOTE PROVIDER - NSDCCPGOAL_GEN_ALL_CORE_FT
Anesthesia Evaluation     Patient summary reviewed and Nursing notes reviewed   no history of anesthetic complications:  NPO Solid Status: > 8 hours  NPO Liquid Status: > 8 hours           Airway   Mallampati: I  TM distance: >3 FB  Neck ROM: full  Possible difficult intubation  Dental - normal exam   (+) edentulous    Pulmonary - normal exam   (+) a smoker Current,   Cardiovascular   Exercise tolerance: good (4-7 METS)    ECG reviewed  Rhythm: regular  Rate: normal    (+) carotid bruits, hyperlipidemia,  carotid artery disease      Neuro/Psych  GI/Hepatic/Renal/Endo    (+)  GERD,  thyroid problem hypothyroidism    Musculoskeletal     (+) arthralgias, chronic pain,   Abdominal  - normal exam    Abdomen: soft.  Bowel sounds: normal.   Substance History      OB/GYN          Other   arthritis, autoimmune disease rheumatoid arthritis,      ROS/Med Hx Other: Assessment:    Closed right hip fracture, initial encounter (HCC)    Rheumatoid arthritis (HCC)    Gastroesophageal reflux disease without esophagitis    Osteoporosis with fracture    Nicotine dependence, cigarettes, uncomplicated     Chronic idiopathic constipation                  Anesthesia Plan    ASA 3     general with block     (Risks and benefits discussed including risk of aspiration, recall and dental damage. All patient questions answered. Will continue with POC.    FI vs PENG single shot PNB for POPC  Risk vs Benefits discussed with patient to include infection, bleeding at the site, paresthesia, and incomplete analgesia.  )  intravenous induction     Anesthetic plan, risks, benefits, and alternatives have been provided, discussed and informed consent has been obtained with: patient.  Pre-procedure education provided      CODE STATUS:    Level Of Support Discussed With: Patient  Code Status (Patient has no pulse and is not breathing): CPR (Attempt to Resuscitate)  Medical Interventions (Patient has pulse or is breathing): Full Support      
To get better and follow your care plan as instructed.

## 2025-05-13 NOTE — OCCUPATIONAL THERAPY INITIAL EVALUATION ADULT - IMPAIRED TRANSFERS: TOILET, REHAB EVAL
ataxic/impaired balance/impaired coordination/impaired motor control/impaired postural control/impaired sensory feedback/decreased strength

## 2025-05-13 NOTE — PROGRESS NOTE ADULT - ASSESSMENT
To be completed.    Imaging-negative acute ischemic stroke with language disturbance and R facial weakness, essentially resolved.    Borderline hypotensive.      It is not clear if family insists on taking Pt home today, or is willing to stay in hospital.        RECOMMENDATIONS    Orthostatic vital signs (preferably x 2).   Alteratively as out-Pt.    ESR, CRP, RPR, DEIRDRE, HgbA1c, hypercoagulation panel, methylmalonic acid, RF, SPEP, as in-Pt or alternatively as out-Pt.      TTE (in-Pt or out-Pt).    Continue ASA 81mg daily, for now indefinitely.    Add clopidogrel 75mg daily; plan to end 6/2/25.    If work-up discloses an indication for anticoagulation for stroke prevention, and the decision is made to anticoagulate, then antiplatelet agents are to be D/C'ed if being given for secondary stroke prevention.                                                             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        Patients requiring neurological follow-up after discharge may contact one of the following offices.     [facility] [place]  [address].  [place], [place] [address] [phone]    [facility]  [address].  [place], [place]  [phone]    Komal Lazaro M.D.   - Department of Neurology  [employer] [facility] [place]      
49 years old male with h/o vertigo was brought in to ED with stroke concern. Patient woke up normal  and went to work. While at work, patient was confused, does not appear to understand communication at work. Patient complain of right arm weakness, numbness of right face and right arm. EMS noted right facial droop as well. Per wife at bedside, patient had slurred speech.   Hemodynamically stable, afebrile, sat well at RA. EKG with NSR. No leukocytosis, plt 217, Cr 1.05, hsTnT 16.8. CT head with no acute intracranial hemorrhage. CTA with no large vessel occlusion or significant stenosis.

## 2025-05-13 NOTE — EEG REPORT - NS EEG TEXT BOX
CLAUDIA JARA [id number]     Study Date: 		05-13-25  Duration: 22m  --------------------------------------------------------------------------------------------------  History:  CC/ HPI Patient is a 49y old  Male who presents with a chief complaint of suspect CVA (12 May 2025 18:35)    MEDICATIONS  (STANDING):    --------------------------------------------------------------------------------------------------  Study Interpretation:    [[[Abbreviation Key:  PDR=alpha rhythm/posterior dominant rhythm. A-P=anterior posterior gradient.  Amplitude: ‘very low’:<20; ‘low’:20-50; ‘medium’:; ‘high’:>200uV.  Persistence for periodic/rhythmic patterns (% of epoch) ‘rare’:<1%; ‘occasional’:1-10%; ‘frequent’:10-50%; ‘abundant’:50-90%; ‘continuous’:>90%.  Persistence for sporadic discharges: ‘rare’:<1/hr; ‘occasional’:1/min-1/hr; ‘frequent’:>1/min; ‘abundant’:>1/10 sec.  GRDA=generalized rhythmic delta activity; FIRDA=frontal intermittent GRDA; LRDA=lateralized rhythmic delta activity; TIRDA=temporal intermittent rhythmic delta activity;  LPD=PLED=lateralized periodic discharges; GPD=generalized periodic discharges; BiPDs=BiPLEDs=bilateral independent periodic epileptiform discharges; SIRPID=stimulus induced rhythmic, periodic, or ictal appearing discharges; BIRDs=brief potentially ictal rhythmic discharges >4 Hz, lasting .5-10s; PFA=paroxysmal bursts of beta/gamma; LVFA=low voltage fast activity.  Modifiers: +F=with fast component; +S=with spike component; +R=with rhythmic component.  S-B=burst suppression pattern.  Max=maximal. N1-drowsy; N2-stage II sleep; N3-slow wave sleep. SSS/BETS=small sharp spikes/benign epileptiform transients of sleep. HV=hyperventilation; PS=photic stimulation]]]    Daily EEG Visual Analysis    FINDINGS:      Background:  Continuity: continuous  Symmetry: symmetric  PDR: 10 Hz activity, with amplitude to 40 uV, that attenuated to eye opening.  Low amplitude frontal beta noted in wakefulness.  Reactivity: present  Voltage: normal, mostly 20-150uV  Anterior Posterior Gradient: present  Other background findings: none  Breach: absent    Background Slowing:  Generalized slowing: none was present.  Focal slowing: none was present.    State Changes:   -Drowsiness noted with increased slowing, attenuation of fast activity, vertex transients.  -N2 sleep transients were not recorded.    Sporadic Epileptiform Discharges:    None    Rhythmic and Periodic Patterns (RPPs):  None     Electrographic and Electroclinical seizures:  None    Other Clinical Events:  None    Activation Procedures:   -Hyperventilation was not performed.    -Photic stimulation was performed and did not elicit any abnormalities.      Artifacts:  Intermittent myogenic and movement artifacts were noted.    ECG:  The heart rate on single channel ECG was predominantly between 70-90 BPM.    EEG Classification / Summary:  Normal  EEG in the awake / drowsy  states    Clinical Impression:  There were no epileptiform abnormalities recorded.      *PRELIM READ, ATTENDING REVIEW PENDING*       -------------------------------------------------------------------------------------------------------  [facility] EEG Reading Room Ph#: [phone]  Epilepsy Answering Service after 5PM and before 8:30AM: Ph#: [phone]    Yovany Rondon DO   Epilepsy Fellow    CLAUDIA JARA [id number]     Study Date: 05-13-25  Duration: 22 min  --------------------------------------------------------------------------------------------------  History:  CC/ HPI Patient is a 49y old  Male who presents with a chief complaint of suspect CVA (12 May 2025 18:35)    MEDICATIONS  (STANDING):    --------------------------------------------------------------------------------------------------  Study Interpretation:    [[[Abbreviation Key:  PDR=alpha rhythm/posterior dominant rhythm. A-P=anterior posterior gradient.  Amplitude: ‘very low’:<20; ‘low’:20-50; ‘medium’:; ‘high’:>200uV.  Persistence for periodic/rhythmic patterns (% of epoch) ‘rare’:<1%; ‘occasional’:1-10%; ‘frequent’:10-50%; ‘abundant’:50-90%; ‘continuous’:>90%.  Persistence for sporadic discharges: ‘rare’:<1/hr; ‘occasional’:1/min-1/hr; ‘frequent’:>1/min; ‘abundant’:>1/10 sec.  GRDA=generalized rhythmic delta activity; FIRDA=frontal intermittent GRDA; LRDA=lateralized rhythmic delta activity; TIRDA=temporal intermittent rhythmic delta activity;  LPD=PLED=lateralized periodic discharges; GPD=generalized periodic discharges; BiPDs=BiPLEDs=bilateral independent periodic epileptiform discharges; SIRPID=stimulus induced rhythmic, periodic, or ictal appearing discharges; BIRDs=brief potentially ictal rhythmic discharges >4 Hz, lasting .5-10s; PFA=paroxysmal bursts of beta/gamma; LVFA=low voltage fast activity.  Modifiers: +F=with fast component; +S=with spike component; +R=with rhythmic component.  S-B=burst suppression pattern.  Max=maximal. N1-drowsy; N2-stage II sleep; N3-slow wave sleep. SSS/BETS=small sharp spikes/benign epileptiform transients of sleep. HV=hyperventilation; PS=photic stimulation]]]    Daily EEG Visual Analysis    FINDINGS:      Background:  Continuity: continuous  Symmetry: symmetric  PDR: 10-10.5 Hz, reactive to eye closure  Voltage: normal  Anterior Posterior Gradient: present, low-amplitude frontal beta activity  Other background findings: none  Breach: absent    Background Slowing:  Generalized slowing: none was present.  Focal slowing: none was present.    State Changes:   Drowsiness is characterized by fragmentation, attenuation, and slowing of the background activity.  Stage 2 sleep is not captured.    Sporadic Epileptiform Discharges:    None    Rhythmic and Periodic Patterns (RPPs):  None     Electrographic and Electroclinical seizures:  None    Other Clinical Events:  None    Activation Procedures:   -Hyperventilation was not performed.    -Photic stimulation was performed and did not elicit any abnormalities.      Artifacts:  Intermittent myogenic and movement artifacts    Single-lead EKG: Regular rhythm    EEG Classification / Summary:  Normal routine EEG in the awake / drowsy states.  No focal or epileptiform abnormalities are captured.  No seizures.    Clinical Impression:  A normal routine EEG neither refutes nor supports a diagnosis of epilepsy.  No epileptiform abnormalities or seizures captured.       -------------------------------------------------------------------------------------------------------  [facility] EEG Reading Room Ph#: [phone]  Epilepsy Answering Service after 5PM and before 8:30AM: Ph#: [phone]    Yovany Rondon DO   Epilepsy Fellow     Camila Dougherty MD  Attending Physician, [facility] [place]    CLAUDIA JARA [id number]     Study Date: 05-13-25  Duration: 22 min  --------------------------------------------------------------------------------------------------  History:  CC/ HPI Patient is a 49y old  Male who presents with a chief complaint of suspect CVA (12 May 2025 18:35)    MEDICATIONS  (STANDING):    --------------------------------------------------------------------------------------------------  Study Interpretation:    [[[Abbreviation Key:  PDR=alpha rhythm/posterior dominant rhythm. A-P=anterior posterior gradient.  Amplitude: ‘very low’:<20; ‘low’:20-50; ‘medium’:; ‘high’:>200uV.  Persistence for periodic/rhythmic patterns (% of epoch) ‘rare’:<1%; ‘occasional’:1-10%; ‘frequent’:10-50%; ‘abundant’:50-90%; ‘continuous’:>90%.  Persistence for sporadic discharges: ‘rare’:<1/hr; ‘occasional’:1/min-1/hr; ‘frequent’:>1/min; ‘abundant’:>1/10 sec.  GRDA=generalized rhythmic delta activity; FIRDA=frontal intermittent GRDA; LRDA=lateralized rhythmic delta activity; TIRDA=temporal intermittent rhythmic delta activity;  LPD=PLED=lateralized periodic discharges; GPD=generalized periodic discharges; BiPDs=BiPLEDs=bilateral independent periodic epileptiform discharges; SIRPID=stimulus induced rhythmic, periodic, or ictal appearing discharges; BIRDs=brief potentially ictal rhythmic discharges >4 Hz, lasting .5-10s; PFA=paroxysmal bursts of beta/gamma; LVFA=low voltage fast activity.  Modifiers: +F=with fast component; +S=with spike component; +R=with rhythmic component.  S-B=burst suppression pattern.  Max=maximal. N1-drowsy; N2-stage II sleep; N3-slow wave sleep. SSS/BETS=small sharp spikes/benign epileptiform transients of sleep. HV=hyperventilation; PS=photic stimulation]]]    Daily EEG Visual Analysis    FINDINGS:      Background:  Continuity: continuous  Symmetry: symmetric  PDR: 10-10.5 Hz, reactive to eye closure  Voltage: normal  Anterior Posterior Gradient: present, low-amplitude frontal beta activity  Other background findings: none  Breach: absent    Background Slowing:  Generalized slowing: none  Focal slowing: none    State Changes:   Drowsiness is characterized by fragmentation, attenuation, and slowing of the background activity.  Stage 2 sleep is not captured.    Sporadic Epileptiform Discharges:    None    Rhythmic and Periodic Patterns (RPPs):  None     Electrographic and Electroclinical seizures:  None    Other Clinical Events:  None    Activation Procedures:   -Hyperventilation was not performed.    -Photic stimulation was performed and did not elicit any abnormalities.      Artifacts:  Intermittent myogenic and movement artifacts    Single-lead EKG: Regular rhythm    EEG Classification / Summary:  Normal routine EEG in the awake / drowsy states.  No focal or epileptiform abnormalities are captured.  No seizures.    Clinical Impression:  A normal routine EEG neither refutes nor supports a diagnosis of epilepsy.  No epileptiform abnormalities or seizures captured.       -------------------------------------------------------------------------------------------------------  [facility] EEG Reading Room Ph#: [phone]  Epilepsy Answering Service after 5PM and before 8:30AM: Ph#: [phone]    Yovany Rondon DO   Epilepsy Fellow     Camila Dougherty MD  Attending Physician, [facility]psy [place]

## 2025-05-13 NOTE — DISCHARGE NOTE PROVIDER - HOSPITAL COURSE
49 years old male with h/o vertigo was brought in to ED with stroke concern. Patient woke up normal  and went to work. While at work, patient was confused, does not appear to understand communication at work. Patient complain of right arm weakness, numbness of right face and right arm. EMS noted right facial droop as well. Per wife at bedside, patient had slurred speech.   Hemodynamically stable, afebrile, sat well at RA. EKG with NSR. No leukocytosis, plt 217, Cr 1.05, hsTnT 16.8. CT head with no acute intracranial hemorrhage. CTA with no large vessel occlusion or significant stenosis.      TIA  Present with transient slurred speech, facial droop, right arm weakness and numbness.   Symptoms resolved   CT head  with no acute intracranial hemorrhage. CTA with no large vessel occlusion or significant stenosis  received aspirin. Continue with aspirin, high intensity statin  MR brain neg  EEG neg  neuro eval appreciated  PT recc: acute rehab based on initial neuro diagnosis, however MR neg, pt ambulating independently, dc with home pt      Problem/Plan - 2:  ·  Problem: Conjunctivitis, allergic, bilateral.   ·  Plan: ordered antihistamine PO and eye drop.   49 years old male with h/o vertigo was brought in to ED with stroke concern. Patient woke up normal  and went to work. While at work, patient was confused, does not appear to understand communication at work. Patient complain of right arm weakness, numbness of right face and right arm. EMS noted right facial droop as well. Per wife at bedside, patient had slurred speech.   Hemodynamically stable, afebrile, sat well at RA. EKG with NSR. No leukocytosis, plt 217, Cr 1.05, hsTnT 16.8. CT head with no acute intracranial hemorrhage. CTA with no large vessel occlusion or significant stenosis.      TIA vs clinical CVA  Present with transient slurred speech, facial droop, right arm weakness and numbness.   Symptoms resolved   CT head  with no acute intracranial hemorrhage. CTA with no large vessel occlusion or significant stenosis  received aspirin. Continue with aspirin, Plavix X 21 days , statin   MR brain neg  EEG neg  neuro eval appreciated  PT recc: acute rehab based on initial neuro diagnosis, however MR neg, pt ambulating independently, dc with home pt   ESR, CRP, RPR, DEIRDRE, HgbA1c, hypercoagulation panel, methylmalonic acid, RF, SPEP.       Problem/Plan - 2:  ·  Problem: Conjunctivitis, allergic, bilateral.   ·  Plan: ordered antihistamine PO and eye drop.   49 years old male with h/o vertigo was brought in to ED with stroke concern. Patient woke up normal  and went to work. While at work, patient was confused, does not appear to understand communication at work. Patient complain of right arm weakness, numbness of right face and right arm. EMS noted right facial droop as well. Per wife at bedside, patient had slurred speech.   Hemodynamically stable, afebrile, sat well at RA. EKG with NSR. No leukocytosis, plt 217, Cr 1.05, hsTnT 16.8. CT head with no acute intracranial hemorrhage. CTA with no large vessel occlusion or significant stenosis.      TIA vs clinical CVA  Present with transient slurred speech, facial droop, right arm weakness and numbness.   Symptoms resolved   CT head  with no acute intracranial hemorrhage. CTA with no large vessel occlusion or significant stenosis  received aspirin. Continue with aspirin, Plavix X 21 days , statin   MR brain neg  EEG neg  neuro eval appreciated  PT recc: acute rehab based on initial neuro diagnosis, however MR neg, pt ambulating independently, dc with home pt   ESR, CRP, RPR, DEIRDRE, HgbA1c, hypercoagulation panel, methylmalonic acid, RF, SPEP.  -can be done outpt      Problem/Plan - 2:  ·  Problem: Conjunctivitis, allergic, bilateral.   ·  Plan: ordered antihistamine PO and eye drop.   49 years old male with h/o vertigo was brought in to ED with stroke concern. Patient woke up normal  and went to work. While at work, patient was confused, does not appear to understand communication at work. Patient complain of right arm weakness, numbness of right face and right arm. EMS noted right facial droop as well. Per wife at bedside, patient had slurred speech.   Hemodynamically stable, afebrile, sat well at RA. EKG with NSR. No leukocytosis, plt 217, Cr 1.05, hsTnT 16.8. CT head with no acute intracranial hemorrhage. CTA with no large vessel occlusion or significant stenosis.      TIA vs clinical CVA  Present with transient slurred speech, facial droop, right arm weakness and numbness.   Symptoms resolved   CT head  with no acute intracranial hemorrhage. CTA with no large vessel occlusion or significant stenosis  received aspirin. Continue with aspirin, Plavix X 21 days , statin   MR brain neg  EEG neg  neuro eval appreciated  PT recc: acute rehab based on initial neuro diagnosis, however MR neg, pt ambulating independently, dc with home pt per family request   ESR, CRP, RPR, DEIRDRE, HgbA1c, hypercoagulation panel, methylmalonic acid, RF, SPEP.  -can be done outpt      Problem/Plan - 2:  ·  Problem: Conjunctivitis, allergic, bilateral.   ·  Plan: ordered antihistamine PO and eye drop.   49 years old male with h/o vertigo was brought in to ED with stroke concern. Patient woke up normal  and went to work. While at work, patient was confused, does not appear to understand communication at work. Patient complain of right arm weakness, numbness of right face and right arm. EMS noted right facial droop as well. Per wife at bedside, patient had slurred speech.   Hemodynamically stable, afebrile, sat well at RA. EKG with NSR. No leukocytosis, plt 217, Cr 1.05, hsTnT 16.8. CT head with no acute intracranial hemorrhage. CTA with no large vessel occlusion or significant stenosis.      TIA vs clinical CVA  Present with transient slurred speech, facial droop, right arm weakness and numbness.   Symptoms resolved   CT head  with no acute intracranial hemorrhage. CTA with no large vessel occlusion or significant stenosis  received aspirin. Continue with aspirin, Plavix X 21 days , statin   MR brain neg  EEG neg  neuro eval appreciated  PT recc: acute rehab but dc with home pt per family request   ESR, CRP, RPR, DEIRDRE, HgbA1c, hypercoagulation panel, methylmalonic acid, RF, SPEP.  -can be done outpt   Echo outpt     Problem/Plan - 2:  ·  Problem: Conjunctivitis, allergic, bilateral.   ·  Plan: ordered antihistamine PO and eye drop.

## 2025-05-13 NOTE — DISCHARGE NOTE PROVIDER - NSDCCPCAREPLAN_GEN_ALL_CORE_FT
PRINCIPAL DISCHARGE DIAGNOSIS  Diagnosis: Transient ischemic attack (TIA)  Assessment and Plan of Treatment: 49 years old male with h/o vertigo was brought in to ED with stroke concern. Patient woke up normal  and went to work. While at work, patient was confused, does not appear to understand communication at work. Patient complain of right arm weakness, numbness of right face and right arm. EMS noted right facial droop as well. Per wife at bedside, patient had slurred speech.   Hemodynamically stable, afebrile, sat well at RA. EKG with NSR. No leukocytosis, plt 217, Cr 1.05, hsTnT 16.8. CT head with no acute intracranial hemorrhage. CTA with no large vessel occlusion or significant stenosis.  TIA  Present with transient slurred speech, facial droop, right arm weakness and numbness.   Symptoms resolved   CT head  with no acute intracranial hemorrhage. CTA with no large vessel occlusion or significant stenosis  received aspirin. Continue with aspirin, high intensity statin  MR brain neg  EEG neg  neuro eval appreciated  PT recc: acute rehab based on initial neuro diagnosis, however MR neg, pt ambulating independently    Problem/Plan - 2:  ·  Problem: Conjunctivitis, allergic, bilateral.   ·  Plan: ordered antihistamine PO and eye drop.     PRINCIPAL DISCHARGE DIAGNOSIS  Diagnosis: Transient ischemic attack (TIA)  Assessment and Plan of Treatment: 49 years old male with h/o vertigo was brought in to ED with stroke concern. Patient woke up normal  and went to work. While at work, patient was confused, does not appear to understand communication at work. Patient complain of right arm weakness, numbness of right face and right arm. EMS noted right facial droop as well. Per wife at bedside, patient had slurred speech.   Hemodynamically stable, afebrile, sat well at RA. EKG with NSR. No leukocytosis, plt 217, Cr 1.05, hsTnT 16.8. CT head with no acute intracranial hemorrhage. CTA with no large vessel occlusion or significant stenosis.  TIA vs clinical CVA  Present with transient slurred speech, facial droop, right arm weakness and numbness.   Symptoms resolved   CT head  with no acute intracranial hemorrhage. CTA with no large vessel occlusion or significant stenosis  received aspirin. Continue with aspirin, Plavix X 21 days , statin   MR brain neg  EEG neg  neuro eval appreciated  PT recc: acute rehab based on initial neuro diagnosis, however MR neg, pt ambulating independently, dc with home pt   ESR, CRP, RPR, DEIRDRE, HgbA1c, hypercoagulation panel, methylmalonic acid, RF, SPEP.     Problem/Plan - 2:  ·  Problem: Conjunctivitis, allergic, bilateral.   ·  Plan: ordered antihistamine PO and eye drop.       PRINCIPAL DISCHARGE DIAGNOSIS  Diagnosis: Transient ischemic attack (TIA)  Assessment and Plan of Treatment: 49 years old male with h/o vertigo was brought in to ED with stroke concern. Patient woke up normal  and went to work. While at work, patient was confused, does not appear to understand communication at work. Patient complain of right arm weakness, numbness of right face and right arm. EMS noted right facial droop as well. Per wife at bedside, patient had slurred speech.   Hemodynamically stable, afebrile, sat well at RA. EKG with NSR. No leukocytosis, plt 217, Cr 1.05, hsTnT 16.8. CT head with no acute intracranial hemorrhage. CTA with no large vessel occlusion or significant stenosis.  TIA vs clinical CVA  Present with transient slurred speech, facial droop, right arm weakness and numbness.   Symptoms resolved   CT head  with no acute intracranial hemorrhage. CTA with no large vessel occlusion or significant stenosis  received aspirin. Continue with aspirin, Plavix X 21 days , statin   MR brain neg  EEG neg  neuro eval appreciated  PT recc: acute rehab based on initial neuro diagnosis, however MR neg, pt ambulating independently, dc with home pt   ESR, CRP, RPR, DEIRDRE, HgbA1c, hypercoagulation panel, methylmalonic acid, RF, SPEP.  can be done outpt    Problem/Plan - 2:  ·  Problem: Conjunctivitis, allergic, bilateral.   ·  Plan: ordered antihistamine PO and eye drop.       PRINCIPAL DISCHARGE DIAGNOSIS  Diagnosis: Transient ischemic attack (TIA)  Assessment and Plan of Treatment: 49 years old male with h/o vertigo was brought in to ED with stroke concern. Patient woke up normal  and went to work. While at work, patient was confused, does not appear to understand communication at work. Patient complain of right arm weakness, numbness of right face and right arm. EMS noted right facial droop as well. Per wife at bedside, patient had slurred speech.   Hemodynamically stable, afebrile, sat well at RA. EKG with NSR. No leukocytosis, plt 217, Cr 1.05, hsTnT 16.8. CT head with no acute intracranial hemorrhage. CTA with no large vessel occlusion or significant stenosis.  TIA vs clinical CVA  Present with transient slurred speech, facial droop, right arm weakness and numbness.   Symptoms resolved   CT head  with no acute intracranial hemorrhage. CTA with no large vessel occlusion or significant stenosis  received aspirin. Continue with aspirin, Plavix X 21 days , statin   MR brain neg  EEG neg  neuro eval appreciated  Echo outpt   ESR, CRP, RPR, DEIRDRE, HgbA1c, hypercoagulation panel, methylmalonic acid, RF, SPEP.  can be done outpt    Problem/Plan - 2:  ·  Problem: Conjunctivitis, allergic, bilateral.   ·  Plan: ordered antihistamine PO and eye drop.

## 2025-05-13 NOTE — DISCHARGE NOTE NURSING/CASE MANAGEMENT/SOCIAL WORK - FINANCIAL ASSISTANCE
[facility] provides services at a reduced cost to those who are determined to be eligible through [facility]’s financial assistance program. Information regarding [facility]’s financial assistance program can be found by going to [email or web address] or by calling [phone].

## 2025-05-13 NOTE — DISCHARGE NOTE PROVIDER - NSDCFUADDAPPT_GEN_ALL_CORE_FT
APPTS ARE READY TO BE MADE: [ x] YES    Best Family or Patient Contact:  Wife [phone]    Additional Information about above appointments (if needed):    1: Neurologist        Other comments or requests:

## 2025-05-13 NOTE — PROGRESS NOTE ADULT - SUBJECTIVE AND OBJECTIVE BOX
To be completed
Patient is a 49y old  Male who presents with a chief complaint of suspect CVA (12 May 2025 18:35)      INTERVAL HPI/OVERNIGHT EVENTS:  Pt was seen and examined, no acute events.      MEDICATIONS  (STANDING):  aspirin enteric coated 81 milliGRAM(s) Oral daily  atorvastatin 80 milliGRAM(s) Oral at bedtime  cetirizine 10 milliGRAM(s) Oral daily  enoxaparin Injectable 40 milliGRAM(s) SubCutaneous every 24 hours  ketotifen 0.025% Ophthalmic Solution 1 Drop(s) Both EYES two times a day    MEDICATIONS  (PRN):  acetaminophen     Tablet .. 650 milliGRAM(s) Oral every 6 hours PRN Mild Pain (1 - 3), Moderate Pain (4 - 6)  melatonin 3 milliGRAM(s) Oral at bedtime PRN Insomnia      Allergies  No Known Allergies        Vital Signs Last 24 Hrs  T(C): 37 (13 May 2025 10:45), Max: 37.3 (12 May 2025 15:29)  T(F): 98.6 (13 May 2025 10:45), Max: 99.1 (12 May 2025 15:29)  HR: 95 (13 May 2025 12:16) (59 - 106)  BP: 124/80 (13 May 2025 12:16) (99/60 - 130/96)  BP(mean): 92 (12 May 2025 15:29) (92 - 92)  RR: 18 (13 May 2025 12:16) (16 - 18)  SpO2: 96% (13 May 2025 12:16) (94% - 100%)    Parameters below as of 13 May 2025 12:16  Patient On (Oxygen Delivery Method): room air        PHYSICAL EXAM:  GENERAL: NAD  HEAD:  Atraumatic  EYES: PERRLA. conjunctivitis   ENMT: Mouth moist   NECK: Supple  NERVOUS SYSTEM:  Awake, alert, non focal now, ambulating independently   CHEST/LUNG: Clear  HEART: RRR, S1, S2  ABDOMEN: Soft, non tender  EXTREMITIES:  no edema BL LE  SKIN: No rash      LABS:                        13.5   4.40  )-----------( 197      ( 13 May 2025 06:45 )             43.0     05-13    139  |  107  |  15  ----------------------------<  108[H]  4.1   |  28  |  1.01    Ca    8.6      13 May 2025 06:45  Phos  2.8     05-13  Mg     2.2     05-13    TPro  6.8  /  Alb  3.6  /  TBili  0.9  /  DBili  x   /  AST  17  /  ALT  34  /  AlkPhos  69  05-13    PT/INR - ( 12 May 2025 13:45 )   PT: 12.2 sec;   INR: 1.08 ratio         PTT - ( 12 May 2025 13:45 )  PTT:29.6 sec  Urinalysis Basic - ( 13 May 2025 06:45 )    Color: x / Appearance: x / SG: x / pH: x  Gluc: 108 mg/dL / Ketone: x  / Bili: x / Urobili: x   Blood: x / Protein: x / Nitrite: x   Leuk Esterase: x / RBC: x / WBC x   Sq Epi: x / Non Sq Epi: x / Bacteria: x      CAPILLARY BLOOD GLUCOSE      POCT Blood Glucose.: 116 mg/dL (12 May 2025 13:39)      RADIOLOGY & ADDITIONAL TESTS:    Imaging Personally Reviewed:  [ ] YES  [ ] NO    Consultant(s) Notes Reviewed:  [ ] YES  [ ] NO    Care Discussed with Consultants/Other Providers [ ] YES  [ ] NO

## 2025-05-13 NOTE — DISCHARGE NOTE PROVIDER - CARE PROVIDER_API CALL
pcp,   Phone: (   )    -  Fax: (   )    -  Follow Up Time:     Donald Lynch)  Neurology  [address], [address]  [place] [address]  Phone: [phone]  Fax: [phone]  Follow Up Time:

## 2025-05-13 NOTE — PROGRESS NOTE ADULT - PROBLEM SELECTOR PLAN 1
present with transient slurred speech, facial droop, right arm weakness and numbness. Slurred speech, facial droop and right arm weakness resolved. Still have slight decrease sensation in right face, right arm and slightly less in right extremity.  CT head  with no acute intracranial hemorrhage. CTA with no large vessel occlusion or significant stenosis  received aspirin. Continue with aspirin, high intensity statin  Check MRI brain, echo with bubble if MR positive   neuro check, telemetry monitoring, lipid panel, A1c, PT consult  Permissive HTN for 24 hours from symptoms onset  EEG neg  ED consulted neurology

## 2025-05-13 NOTE — OCCUPATIONAL THERAPY INITIAL EVALUATION ADULT - ADDITIONAL COMMENTS
Pt lives with family in a private house with 4 steps with bilateral handrails to enter. Once inside, the pt has 13 steps with a L handrail to reach the main floor where the bedroom and bathroom is. The pt ambulates with no device and does not own any device for ambulation.

## 2025-05-13 NOTE — PHYSICAL THERAPY INITIAL EVALUATION ADULT - ADDITIONAL COMMENTS
Patient lives in a house with 4 steps to enter, +B rails. Once inside, patient has 13 steps to bedroom with L rail up. Patient works at [employer].

## 2025-05-29 ENCOUNTER — INPATIENT (INPATIENT)
Facility: HOSPITAL | Age: 50
LOS: 2 days | Discharge: HOME | End: 2025-06-01
Attending: STUDENT IN AN ORGANIZED HEALTH CARE EDUCATION/TRAINING PROGRAM | Admitting: STUDENT IN AN ORGANIZED HEALTH CARE EDUCATION/TRAINING PROGRAM
Payer: COMMERCIAL

## 2025-05-29 VITALS
DIASTOLIC BLOOD PRESSURE: 86 MMHG | HEART RATE: 93 BPM | OXYGEN SATURATION: 100 % | SYSTOLIC BLOOD PRESSURE: 134 MMHG | HEIGHT: 66 IN | WEIGHT: 179.9 LBS | RESPIRATION RATE: 16 BRPM | TEMPERATURE: 99 F

## 2025-05-29 LAB
HCT VFR BLD CALC: 43.5 % — SIGNIFICANT CHANGE UP (ref 39–50)
HGB BLD-MCNC: 14 G/DL — SIGNIFICANT CHANGE UP (ref 13–17)
MCHC RBC-ENTMCNC: 26.3 PG — LOW (ref 27–34)
MCHC RBC-ENTMCNC: 32.2 G/DL — SIGNIFICANT CHANGE UP (ref 32–36)
MCV RBC AUTO: 81.8 FL — SIGNIFICANT CHANGE UP (ref 80–100)
PLATELET # BLD AUTO: 195 K/UL — SIGNIFICANT CHANGE UP (ref 150–400)
RBC # BLD: 5.32 M/UL — SIGNIFICANT CHANGE UP (ref 4.2–5.8)
RBC # FLD: 15.3 % — HIGH (ref 10.3–14.5)
WBC # BLD: 4.08 K/UL — SIGNIFICANT CHANGE UP (ref 3.8–10.5)
WBC # FLD AUTO: 4.08 K/UL — SIGNIFICANT CHANGE UP (ref 3.8–10.5)

## 2025-05-29 PROCEDURE — 99285 EMERGENCY DEPT VISIT HI MDM: CPT

## 2025-05-29 PROCEDURE — 0042T: CPT

## 2025-05-29 PROCEDURE — 70498 CT ANGIOGRAPHY NECK: CPT | Mod: 26

## 2025-05-29 NOTE — ED ADULT TRIAGE NOTE - INTERPRETER NAME
Ventricular Rate : 58   Atrial Rate : 58   P-R Interval : 166   QRS Duration : 88   Q-T Interval : 386   QTC Calculation(Bezet) : 378   P Axis : 15   R Axis : -4   T Axis : 9   Diagnosis : Sinus bradycardia~Moderate voltage criteria for LVH, may be normal variant~Borderline ECG~When compared with ECG of 18-APR-2018 16:45,~No significant change was found~Confirmed by Deisi Soriano MD, Chetna Angle ([id number]) on 4/25/2018 2:03:40 PM
Kendra

## 2025-05-29 NOTE — ED ADULT TRIAGE NOTE - CHIEF COMPLAINT QUOTE
As per family states pt came out the bathroom at 2230 confused, blurred vision to right eye, weakness and left sided facial droop. States pt with a TIA x 2 weeks ago.

## 2025-05-29 NOTE — ED ADULT TRIAGE NOTE - HEIGHT IN CM
Support given to patient in fetal demise. \"[employer]\" early pregnancy loss brochure and [employer] support group information given along with contact information. Kasia Alvarado M.Div.   
Obey Henry
[phone]

## 2025-05-30 ENCOUNTER — RESULT REVIEW (OUTPATIENT)
Age: 50
End: 2025-05-30

## 2025-05-30 LAB
ALBUMIN SERPL ELPH-MCNC: 3.9 G/DL — SIGNIFICANT CHANGE UP (ref 3.3–5)
ALP SERPL-CCNC: 91 U/L — SIGNIFICANT CHANGE UP (ref 40–120)
ALT FLD-CCNC: 64 U/L — SIGNIFICANT CHANGE UP (ref 12–78)
ANION GAP SERPL CALC-SCNC: 6 MMOL/L — SIGNIFICANT CHANGE UP (ref 5–17)
ANISOCYTOSIS BLD QL: SLIGHT — SIGNIFICANT CHANGE UP
APTT BLD: 28.5 SEC — SIGNIFICANT CHANGE UP (ref 26.1–36.8)
APTT BLD: 30.3 SEC — SIGNIFICANT CHANGE UP (ref 26.1–36.8)
AST SERPL-CCNC: 27 U/L — SIGNIFICANT CHANGE UP (ref 15–37)
BASOPHILS # BLD AUTO: 0 K/UL — SIGNIFICANT CHANGE UP (ref 0–0.2)
BASOPHILS NFR BLD AUTO: 0 % — SIGNIFICANT CHANGE UP (ref 0–2)
BILIRUB SERPL-MCNC: 0.6 MG/DL — SIGNIFICANT CHANGE UP (ref 0.2–1.2)
BUN SERPL-MCNC: 15 MG/DL — SIGNIFICANT CHANGE UP (ref 7–23)
CALCIUM SERPL-MCNC: 8.7 MG/DL — SIGNIFICANT CHANGE UP (ref 8.5–10.1)
CHLORIDE SERPL-SCNC: 107 MMOL/L — SIGNIFICANT CHANGE UP (ref 96–108)
CO2 SERPL-SCNC: 27 MMOL/L — SIGNIFICANT CHANGE UP (ref 22–31)
CREAT SERPL-MCNC: 1.19 MG/DL — SIGNIFICANT CHANGE UP (ref 0.5–1.3)
D DIMER BLD IA.RAPID-MCNC: <150 NG/ML DDU — SIGNIFICANT CHANGE UP
EGFR: 75 ML/MIN/1.73M2 — SIGNIFICANT CHANGE UP
EGFR: 75 ML/MIN/1.73M2 — SIGNIFICANT CHANGE UP
EOSINOPHIL # BLD AUTO: 0.04 K/UL — SIGNIFICANT CHANGE UP (ref 0–0.5)
EOSINOPHIL NFR BLD AUTO: 1 % — SIGNIFICANT CHANGE UP (ref 0–6)
ERYTHROCYTE [SEDIMENTATION RATE] IN BLOOD: 2 MM/HR — SIGNIFICANT CHANGE UP (ref 0–15)
FIBRINOGEN PPP-MCNC: 204 MG/DL — SIGNIFICANT CHANGE UP (ref 200–480)
GLUCOSE SERPL-MCNC: 134 MG/DL — HIGH (ref 70–99)
INR BLD: 1.04 RATIO — SIGNIFICANT CHANGE UP (ref 0.85–1.16)
INR BLD: 1.05 RATIO — SIGNIFICANT CHANGE UP (ref 0.85–1.16)
LG PLATELETS BLD QL AUTO: SLIGHT — SIGNIFICANT CHANGE UP
LYMPHOCYTES # BLD AUTO: 1.35 K/UL — SIGNIFICANT CHANGE UP (ref 1–3.3)
LYMPHOCYTES # BLD AUTO: 33 % — SIGNIFICANT CHANGE UP (ref 13–44)
MANUAL SMEAR VERIFICATION: SIGNIFICANT CHANGE UP
MICROCYTES BLD QL: SLIGHT — SIGNIFICANT CHANGE UP
MONOCYTES # BLD AUTO: 0.78 K/UL — SIGNIFICANT CHANGE UP (ref 0–0.9)
MONOCYTES NFR BLD AUTO: 19 % — HIGH (ref 2–14)
NEUTROPHILS # BLD AUTO: 1.63 K/UL — LOW (ref 1.8–7.4)
NEUTROPHILS NFR BLD AUTO: 40 % — LOW (ref 43–77)
NRBC # BLD: 0 /100 WBCS — SIGNIFICANT CHANGE UP (ref 0–0)
NRBC BLD AUTO-RTO: SIGNIFICANT CHANGE UP /100 WBCS (ref 0–0)
NRBC BLD-RTO: 0 /100 WBCS — SIGNIFICANT CHANGE UP (ref 0–0)
OVALOCYTES BLD QL SMEAR: SLIGHT — SIGNIFICANT CHANGE UP
PLAT MORPH BLD: NORMAL — SIGNIFICANT CHANGE UP
POTASSIUM SERPL-MCNC: 3.7 MMOL/L — SIGNIFICANT CHANGE UP (ref 3.5–5.3)
POTASSIUM SERPL-SCNC: 3.7 MMOL/L — SIGNIFICANT CHANGE UP (ref 3.5–5.3)
PROT SERPL-MCNC: 7.4 GM/DL — SIGNIFICANT CHANGE UP (ref 6–8.3)
PROTHROM AB SERPL-ACNC: 11.9 SEC — SIGNIFICANT CHANGE UP (ref 9.9–13.4)
PROTHROM AB SERPL-ACNC: 12.1 SEC — SIGNIFICANT CHANGE UP (ref 9.9–13.4)
RBC BLD AUTO: ABNORMAL
SODIUM SERPL-SCNC: 140 MMOL/L — SIGNIFICANT CHANGE UP (ref 135–145)
STOMATOCYTES BLD QL SMEAR: SLIGHT — SIGNIFICANT CHANGE UP
TROPONIN I, HIGH SENSITIVITY RESULT: 5.3 NG/L — SIGNIFICANT CHANGE UP
VARIANT LYMPHS # BLD: 7 % — HIGH (ref 0–6)
VARIANT LYMPHS NFR BLD MANUAL: 7 % — HIGH (ref 0–6)

## 2025-05-30 PROCEDURE — 93010 ELECTROCARDIOGRAM REPORT: CPT

## 2025-05-30 PROCEDURE — 93306 TTE W/DOPPLER COMPLETE: CPT | Mod: 26

## 2025-05-30 PROCEDURE — 70450 CT HEAD/BRAIN W/O DYE: CPT | Mod: 26,59

## 2025-05-30 PROCEDURE — 70496 CT ANGIOGRAPHY HEAD: CPT | Mod: 26

## 2025-05-30 PROCEDURE — 99232 SBSQ HOSP IP/OBS MODERATE 35: CPT

## 2025-05-30 PROCEDURE — 99223 1ST HOSP IP/OBS HIGH 75: CPT

## 2025-05-30 RX ORDER — KETOTIFEN FUMARATE 0.35 MG/ML
1 SOLUTION/ DROPS OPHTHALMIC
Refills: 0 | Status: DISCONTINUED | OUTPATIENT
Start: 2025-05-30 | End: 2025-06-01

## 2025-05-30 RX ORDER — ASPIRIN 325 MG
81 TABLET ORAL DAILY
Refills: 0 | Status: DISCONTINUED | OUTPATIENT
Start: 2025-05-30 | End: 2025-06-01

## 2025-05-30 RX ORDER — ATORVASTATIN CALCIUM 80 MG/1
80 TABLET, FILM COATED ORAL AT BEDTIME
Refills: 0 | Status: DISCONTINUED | OUTPATIENT
Start: 2025-05-30 | End: 2025-06-01

## 2025-05-30 RX ORDER — ENOXAPARIN SODIUM 100 MG/ML
40 INJECTION SUBCUTANEOUS EVERY 24 HOURS
Refills: 0 | Status: DISCONTINUED | OUTPATIENT
Start: 2025-05-30 | End: 2025-06-01

## 2025-05-30 RX ORDER — LANOLIN/MINERAL OIL/PETROLATUM
1 OINTMENT (GRAM) OPHTHALMIC (EYE)
Refills: 0 | Status: DISCONTINUED | OUTPATIENT
Start: 2025-05-30 | End: 2025-06-01

## 2025-05-30 RX ORDER — CLOPIDOGREL BISULFATE 75 MG/1
75 TABLET, FILM COATED ORAL DAILY
Refills: 0 | Status: DISCONTINUED | OUTPATIENT
Start: 2025-05-30 | End: 2025-06-01

## 2025-05-30 RX ADMIN — Medication 1 DROP(S): at 17:43

## 2025-05-30 RX ADMIN — Medication 10 MILLIGRAM(S): at 07:37

## 2025-05-30 RX ADMIN — ATORVASTATIN CALCIUM 80 MILLIGRAM(S): 80 TABLET, FILM COATED ORAL at 21:30

## 2025-05-30 RX ADMIN — CLOPIDOGREL BISULFATE 75 MILLIGRAM(S): 75 TABLET, FILM COATED ORAL at 07:36

## 2025-05-30 RX ADMIN — KETOTIFEN FUMARATE 1 DROP(S): 0.35 SOLUTION/ DROPS OPHTHALMIC at 06:26

## 2025-05-30 RX ADMIN — Medication 81 MILLIGRAM(S): at 07:36

## 2025-05-30 RX ADMIN — ENOXAPARIN SODIUM 40 MILLIGRAM(S): 100 INJECTION SUBCUTANEOUS at 17:42

## 2025-05-30 NOTE — PHYSICAL THERAPY INITIAL EVALUATION ADULT - DIAGNOSIS, PT EVAL
Pt is capable to doing tasks with supervision to independent in  bed mobility, transfers and ambulation without need of assistive device; pt needs to build up and improve more strength and general endurance.

## 2025-05-30 NOTE — PHYSICAL THERAPY INITIAL EVALUATION ADULT - GAIT TRAINING, PT EVAL
Pt will be able to ambulate using assistive device up to 200 ft or more, be able to negotiate steps safely observing proper gait, posture and prevent falls and eventually be a safe community ambulator..

## 2025-05-30 NOTE — ED ADULT NURSE NOTE - NSFALLHARMRISKINTERV_ED_ALL_ED

## 2025-05-30 NOTE — PHYSICAL THERAPY INITIAL EVALUATION ADULT - LIVES WITH, PROFILE
Pt lives in a pvt house with wife and few family members has steps with rails to enter the house, BR on the second floor with 1 flight of steps with rails.

## 2025-05-30 NOTE — ED ADULT NURSE NOTE - OBJECTIVE STATEMENT
Pt is a 50yo Male AAOx4 NKDA pmh TIA 2wk ago, BIBA code stroke pw B/L weakness, left sided facial droop, and slurred speech starting around 2230. On arrival to the emergency department symptoms have resolved. Code stroke called. Pt labs sent as ordered. Pt placed on monitor, NSR to 90. Pt RA saturations 97%, no tachypnea. Pt NIHSS 0 at this time. Pt . Seizure precautions in place. EKG done. Pt and family updated on plan of care. WCTM.

## 2025-05-30 NOTE — PHYSICAL THERAPY INITIAL EVALUATION ADULT - PREDICTED DURATION OF THERAPY (DAYS/WKS), PT EVAL
will continue to be on program for follow up on AROM , ambulation training and observe for any changes in functional ability

## 2025-05-30 NOTE — SWALLOW BEDSIDE ASSESSMENT ADULT - SWALLOW EVAL: PATIENT/FAMILY GOALS STATEMENT
pt reported being in the hospital 2 weeks ago for similar symptoms. Pt reported feeling dizzy upon hospital admission however he no longer feels dizzy. pt reported being in the hospital 2 weeks ago for similar symptoms. Pt reported feeling dizzy upon hospital admission however he no longer feels dizzy. Pt requested water.

## 2025-05-30 NOTE — CHART NOTE - NSCHARTNOTEFT_GEN_A_CORE
Patient is here for an echo agitated saline study or "bubble study".  Firstly a working IV was identified and flushed w/ saline.  Then the IV line was connect to a three-way stopcock to the cannula either directly or through an extension tube.   Then a 10ml saline syringe was attached to 1 side of the valve while a 5ml syringe was attached to the other valve.  The saline was vigorously agitated and then injected into the vein, performed twice.  A dense contrast effect in the right side of the heart was identified.     Patient tolerated procedure well, no complaints verbalized.

## 2025-05-30 NOTE — H&P ADULT - HISTORY OF PRESENT ILLNESS
49-year-old male with hx of history of TIA 16 days ago presents today with symptoms of slurred speech and left-sided facial drooping. Patient noticed that around 10:30 PM, he developed blurriness in the right eye. Wife states that shortly after he began stuttering when talking to her. He also developed left-sided facial droop. Wife states that he appeared to have right-handed weakness. She also states he was cramping the fingers in his left hand. He denies numbness, chest pain, SOB, headache. Patient does report dizziness. All other symptoms are now resolved.     Patient had similar episode 16 days ago and workup was done with negative findings. Patient was sent for outpatient echocardiogram at the time. Patient states he wishes to have echocardiogram done inpatient.

## 2025-05-30 NOTE — CONSULT NOTE ADULT - ASSESSMENT
49-year-old male with hx of history of TIA 16 days ago presents today with symptoms of slurred speech and left-sided facial drooping, resolved. Similar event 2 weeks kevin, MRI then nomral. Exam non-fcoal NIHSS 0  CTH CTA CTP wnl    TIA    On Aspirin 81 and Plavix 75  maintain statin  TTE with bubble study and telemetry  hypercoagulable labs  DVT prophylaxis, Neurochecks   gradual normotension.   HbA1C and LDL.   PT/OT/Speech and swallow/safety eval.  f/U dr. [place]ar[place]

## 2025-05-30 NOTE — SWALLOW BEDSIDE ASSESSMENT ADULT - SWALLOW EVAL: DIAGNOSIS
patient presented with oropharyngeal phases of swallow WFL for puree, solid and thin liquid. oral phase marked by adequate labial seal/oral containment, functional mastication bolus breakdown for solid, adequate A-P transport and oral clearance. +initiation of pharyngeal swallow trigger and hyolaryngeal excursion to palpation without evidence of impaired airway protection. No overt s/s of laryngeal penetration/aspiration.

## 2025-05-30 NOTE — ED PROVIDER NOTE - CLINICAL SUMMARY MEDICAL DECISION MAKING FREE TEXT BOX
49-year-old male with hx of history of TIA 16 days ago where he was admitted hide negative MRI negative EEG negative CTAs.  Discharged on aspirin and Plavix at that time.  Patient presenting to the ED reporting acute onset confusion aphasia dysarthria left facial droop left hand cramping that started acutely at 10:30 PM witnessed by wife.  EMS was called.  Symptoms resolved prior to EMS arrival.  Patient now has no complaints.  No headache no nausea.    vitals normal. temp 99f  Well-appearing airway intact no respiratory distress no abdominal pain nonfocal neuro exam no facial droop no slurred speech no dysarthria.  5 out of 5 strength all 4 extremities.  Ambulatory no ataxia.  Cranial nerves I through XII intact    Plan to obtain:  -Suspect another TIA despite him being on aspirin and Plavix.  His NIH is 0 at this time.  Pending CTA head and neck.  CT perfusion.  Will reassess after workup.  He was just in the hospital 16 days ago for similar presentation as per the wife with a negative workup and was discharged at this time.    note never had sonogram of heart during last admission  ED w/u today neg, pt still neuro intact feels better  tba for echo.

## 2025-05-30 NOTE — PHYSICAL THERAPY INITIAL EVALUATION ADULT - ADL SKILLS, REHAB EVAL
PRENATAL VISIT   FIRST OBSTETRICAL EXAM - OB    Assessment / Impression     Morbid obesity:  Recommend stopping all simple carbohydrates.  Recommended to have vegetables, fruit, and proteins at each meal and to eat 3-4 times daily, small portions . Recommended less than 15 lbs of weight gain with this pregnancy.    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    first prenatal visit at 15w2d  Discussed orientation, general information, lifestyle, nutrition, exercise,warning signs, resources, lab testing, risk screening and discussed cystic fibrosis screening with patient.  Questions answered.    Plan:     Encouraged diet changes.    Monitor for diabetes:  Needs 1 hour at next visit.     Initial labs drawn.  Prenatal vitamins.  Problem list reviewed and updated.  Genetic screening test options discussed:  Patient elects to decline all testing  Role of ultrasound in pregnancy discussed; fetal survey: ordered.  Follow up: Return in 4 weeks (on 2019) for prenatal care.   Is 1 hour blood sugar at next visit.      Subjective:    See Pratik is a 27 y.o.  here today for her First Obstetrical Exam.   She continues to follow along with going on for her known granulosa cell tumor.  She has gone back to eating rice and large portions on a daily basis multiple times a day.  She says she feels better when she does this.  She has not yet eaten at the time of her visit today.  She denies any bleeding, cramping, leaking of water.  She denies any fetal movement at this time.  OB History    Para Term  AB Living   3 2 2     2   SAB TAB Ectopic Multiple Live Births         0 2      # Outcome Date GA Lbr Kyree/2nd Weight Sex Delivery Anes PTL Lv   3 Current            2 Term 17 39w5d 03:29 / 00:06 8 lb 8.2 oz (3.86 kg) M Vag-Spont Local N ARMANDO   1 Term 02/23/15 39w1d 04:39 / 04:18 7 lb 9 oz (3.43 kg) M Vag-Spont EPI N ARMANDO       Expected Date of Delivery: 2019, by Ultrasound    Past  "Medical History:   Diagnosis Date     Migraine      Past Surgical History:   Procedure Laterality Date     [place] LAP,DIAGNOSTIC ABDOMEN N/A 2/23/2019    Procedure: LAPAROSCOPY, DIAGNOSTIC, RIGHT OOPHERECTOMY;  Surgeon: Leeann Elizabeth DO;  Location: [facility] OR;  Service: Gynecology     Social History     Tobacco Use     Smoking status: Never Smoker     Smokeless tobacco: Never Used   Substance Use Topics     Alcohol use: No     Drug use: No     Current Outpatient Medications   Medication Sig Dispense Refill     drospirenone-ethinyl estradiol (STACI, 28,) 3-0.03 mg per tablet Take 1 tablet by mouth daily. 3 Package 11     folic acid (FOLVITE) 1 MG tablet Take 5 tablets (5 mg total) by mouth daily. 450 tablet 12     prenatal vit-iron fum-folic ac (PRENATAL VITAMIN) 27 mg iron- 0.8 mg Tab tablet Take 1 tablet by mouth daily. 90 tablet 12     No current facility-administered medications for this visit.      No Known Allergies          High Risk Behavior: Significantly overweight or underweight    Review of Systems  General:  Denies problem  Eyes: Denies problem  Ears/Nose/Throat: Denies problem  Cardiovascular: Denies problem  Respiratory:  Denies problem  Gastrointestinal:  Denies problem, Genitourinary: Denies problem  Musculoskeletal:  Denies problem  Skin: Denies problem  Neurologic: Denies problem  Psychiatric: Denies problem  Endocrine: Denies problem  Heme/Lymphatic: Denies problem   Allergic/Immunologic: Denies problem       Objective:   Objective    Vitals:    07/05/19 1137   BP: 104/80   Pulse: 84   Resp: 16   Temp: 98.2  F (36.8  C)   TempSrc: Oral   SpO2: 98%   Weight: (!) 230 lb (104.3 kg)   Height: 5' 3\" (1.6 m)     Physical Exam:  General Appearance: Alert, cooperative, no distress, appears stated age  Head: Normocephalic, without obvious abnormality, atraumatic  Eyes: PERRL, conjunctiva/corneas clear, EOM's intact  Ears: Normal TM's and external ear canals, both ears  Nose: Nares normal, septum " midline,mucosa normal, no drainage  Throat: Lips, mucosa, and tongue normal; teeth and gums normal  Neck: Supple, symmetrical, trachea midline, no adenopathy;  thyroid: not enlarged, symmetric, no tenderness/mass/nodules; no carotid bruit or JVD  Back: Symmetric, no curvature, ROM normal, no CVA tenderness  Lungs: Clear to auscultation bilaterally, respirations unlabored  Breasts: No breast masses, tenderness, asymmetry, or nipple discharge.  Heart: Regular rate and rhythm, S1 and S2 normal, no murmur, rub, or gallop, Abdomen: Soft, non-tender, bowel sounds active all four quadrants,  no masses, no organomegaly  Pelvic:Normally developed genitalia with no external lesions or eruptions. Vagina and cervix show no lesions, inflammation, discharge or tenderness. No cystocele, No rectocele.   Extremities: Extremities normal, atraumatic, no cyanosis or edema  Skin: Skin color, texture, turgor normal, no rashes or lesions  Lymph nodes: Cervical, supraclavicular, and axillary nodes normal  Neurologic: Normal     Lab:   Results for orders placed or performed in visit on 04/23/19   Pregnancy, Urine   Result Value Ref Range    Pregnancy Test, Urine Positive (!) Negative         independent

## 2025-05-30 NOTE — H&P ADULT - ASSESSMENT
49-year-old male with hx of history of TIA 16 days ago presents today with symptoms of slurred speech and left-sided facial drooping, admitted for TIA    #TIA  Patient presents with symptoms of left-sided facial drooping, slurred speech, and right hand weakness. All imaging completed and negative. Patient had TIA episode 16 days ago and was admitted but workup was negative. Patient was scheduled for outpatient echocardiogram and has not yet completed it. He wishes to do his echocardiogram inpatient.   - ASA & atorvastatin  - Neurology consult in the AM  - NPO pending S&S eval  - PT/OT/SLP  - Case management & social work consulted  - Neuro checks q4 hrs  - Permissive HTN x 24-48 hours   - HgbA1c, lipid panel pending  - Echocardiogram ordered

## 2025-05-30 NOTE — H&P ADULT - NSHPLABSRESULTS_GEN_ALL_CORE
.  LABS:                         14.0   4.08  )-----------( 195      ( 29 May 2025 23:48 )             43.5     05-29    140  |  107  |  15  ----------------------------<  134[H]  3.7   |  27  |  1.19    Ca    8.7      29 May 2025 23:48    TPro  7.4  /  Alb  3.9  /  TBili  0.6  /  DBili  x   /  AST  27  /  ALT  64  /  AlkPhos  91  05-29    PT/INR - ( 29 May 2025 23:48 )   PT: 12.1 sec;   INR: 1.04 ratio         PTT - ( 29 May 2025 23:48 )  PTT:28.5 sec  Urinalysis Basic - ( 29 May 2025 23:48 )    Color: x / Appearance: x / SG: x / pH: x  Gluc: 134 mg/dL / Ketone: x  / Bili: x / Urobili: x   Blood: x / Protein: x / Nitrite: x   Leuk Esterase: x / RBC: x / WBC x   Sq Epi: x / Non Sq Epi: x / Bacteria: x            RADIOLOGY, EKG & ADDITIONAL TESTS: Reviewed.

## 2025-05-30 NOTE — SWALLOW BEDSIDE ASSESSMENT ADULT - H & P REVIEW
yes "49-year-old male with hx of history of TIA 16 days ago presents today with symptoms of slurred speech and left-sided facial drooping. Patient noticed that around 10:30 PM, he developed blurriness in the right eye. Wife states that shortly after he began stuttering when talking to her. He also developed left-sided facial droop. Wife states that he appeared to have right-handed weakness. She also states he was cramping the fingers in his left hand. He denies numbness, chest pain, SOB, headache. Patient does report dizziness. All other symptoms are now resolved."/yes 1 pair

## 2025-05-30 NOTE — ED PROVIDER NOTE - PROGRESS NOTE DETAILS
Patient not candidate for TNK as his NIH score is 0 and his symptoms resolved.  Patient had TIA.    Code stroke canceled as all imaging is negative patient remains neuro intact.  Endorsed to hospitalist for admission for an echo as he did not get them the last admission.

## 2025-05-30 NOTE — PHYSICAL THERAPY INITIAL EVALUATION ADULT - ADDITIONAL COMMENTS
As per patient and confirmed by his wife Kendra  prior to admission  he is independent in ADLs and does not use any walking device.

## 2025-05-30 NOTE — H&P ADULT - NSHPPHYSICALEXAM_GEN_ALL_CORE
Vital Signs Last 24 Hrs  T(C): 36.8 (30 May 2025 10:00), Max: 37.2 (29 May 2025 23:29)  T(F): 98.2 (30 May 2025 10:00), Max: 99 (29 May 2025 23:29)  HR: 88 (30 May 2025 10:00) (71 - 93)  BP: 108/71 (30 May 2025 10:00) (102/75 - 134/86)  BP(mean): 90 (30 May 2025 05:30) (86 - 90)  RR: 16 (30 May 2025 10:00) (12 - 16)  SpO2: 95% (30 May 2025 10:00) (95% - 100%)    Parameters below as of 30 May 2025 10:00  Patient On (Oxygen Delivery Method): room air    GENERAL: NAD, lying in bed comfortably  HEAD:  Atraumatic, normocephalic  EYES: EOMI, PERRL  NECK: Supple, trachea midline, no JVD  HEART: Regular rate and rhythm  LUNGS: Unlabored respirations.  Clear to auscultation bilaterally, no crackles, wheezing, or rhonchi  ABDOMEN: Soft, nontender, nondistended, +BS  EXTREMITIES: 2+ peripheral pulses bilaterally. No clubbing, cyanosis, or edema  NERVOUS SYSTEM:  A&Ox3, moving all extremities, no focal deficits

## 2025-05-30 NOTE — OCCUPATIONAL THERAPY INITIAL EVALUATION ADULT - PERTINENT HX OF CURRENT PROBLEM, REHAB EVAL
As per H&P; 49-year-old male with hx of history of TIA 16 days ago presents today with symptoms of slurred speech and left-sided facial drooping. Patient noticed that around 10:30 PM, he developed blurriness in the right eye. Wife states that shortly after he began stuttering when talking to her. He also developed left-sided facial droop. Wife states that he appeared to have right-handed weakness. She also states he was cramping the fingers in his left hand. He denies numbness, chest pain, SOB, headache. Patient does report dizziness. All other symptoms are now resolved.     Patient had similar episode 16 days ago and workup was done with negative findings. Patient was sent for outpatient echocardiogram at the time. Patient states he wishes to have echocardiogram done inpatient.

## 2025-05-30 NOTE — ED ADULT NURSE NOTE - ED STAT RN HANDOFF DETAILS
Handoff report given to edh LUIS Jimenez. All pending orders endorsed, safety precautions maintained. Pt AAOx4 at this time. RAC20G. Pt NIHSS 0. Pt and family updated on plan of care.

## 2025-05-30 NOTE — CONSULT NOTE ADULT - SUBJECTIVE AND OBJECTIVE BOX
Neurology consult    CLAUDIA [id number]     Patient is a 49y old  Male who presents with a chief complaint of TIA (30 May 2025 11:21)      HPI:  49-year-old male with hx of history of TIA 16 days ago presents today with symptoms of slurred speech and left-sided facial drooping. Patient noticed that around 10:30 PM, he developed blurriness in the right eye. Wife states that shortly after he began stuttering when talking to her. He also developed left-sided facial droop. Wife states that he appeared to have right-handed weakness. She also states he was cramping the fingers in his left hand. He denies numbness, chest pain, SOB, headache. Patient does report dizziness. All other symptoms are now resolved.     Patient had similar episode 16 days ago and workup was done with negative findings. Patient was sent for outpatient echocardiogram at the time. Patient states he wishes to have echocardiogram done inpatient.  (30 May 2025 11:21)      no difficulty with language.  No vision loss or double vision.  No dizziness, vertigo or new hearing loss.  . No difficulty with swallowing.  No focal weakness.  No focal sensory changes.  No numbness or tingling in the bilateral lower extremities.  No difficulty with balance.  No difficulty with ambulation.    REVIEW OF SYSTEMS:    Constitutional: No fever, chills, fatigue, weakness  Eyes: no eye pain, visual disturbances, or discharge  ENT:  No difficulty hearing, tinnitus, vertigo; No sinus or throat pain  Neck: No pain or stiffness  Respiratory: No cough, dyspnea, wheezing   Cardiovascular: No chest pain, palpitations,   Gastrointestinal: No abdominal or epigastric pain. No nausea, vomiting  No diarrhea or constipation.   Genitourinary: No dysuria, frequency, hematuria or incontinence  Neurological: No headaches, lightheadedness, vertigo, numbness or tremors  Psychiatric: No depression, anxiety, mood swings or difficulty sleeping  Musculoskeletal: No joint pain or swelling; No muscle, back or extremity pain  Skin: No itching, burning, rashes or lesions   Lymph Nodes: No enlarged glands  Endocrine: No heat or cold intolerance; No hair loss, No h/o diabetes or thyroid dysfunction  Allergy and Immunologic: No hives or eczema    MEDICATIONS    aspirin enteric coated 81 milliGRAM(s) Oral daily  atorvastatin 80 milliGRAM(s) Oral at bedtime  cetirizine 10 milliGRAM(s) Oral daily  clopidogrel Tablet 75 milliGRAM(s) Oral daily  ketotifen 0.025% Ophthalmic Solution 1 Drop(s) Both EYES two times a day      PMH:      PSH:     Family history: No history of dementia, strokes, or seizures   FAMILY HISTORY:      SOCIAL HISTORY:  No history of tobacco or alcohol use     Allergies    No Known Allergies    Intolerances        Height (cm): 167.6 (05-29 @ 23:29)  Weight (kg): 82 (05-30 @ 10:00)  BMI (kg/m2): 29.2 (05-30 @ 10:00)    Vital Signs Last 24 Hrs  T(C): 36.8 (30 May 2025 12:40), Max: 37.2 (29 May 2025 23:29)  T(F): 98.2 (30 May 2025 12:40), Max: 99 (29 May 2025 23:29)  HR: 91 (30 May 2025 12:40) (71 - 93)  BP: 118/81 (30 May 2025 12:40) (102/75 - 134/86)  BP(mean): 90 (30 May 2025 05:30) (86 - 90)  RR: 17 (30 May 2025 12:40) (12 - 18)  SpO2: 95% (30 May 2025 12:40) (95% - 100%)    Parameters below as of 30 May 2025 12:40  Patient On (Oxygen Delivery Method): room air          On Neurological Examination:    Mental Status - Patient is alertNeuro: AAOx3; knows age, month, obeys commands, no dysarthria; calculations intact, fluent names, repeats     CN: PERRL, EOMI, VFF normal gaze preference, no facial palsy,     Motor: no drift in all extremeties    Sensory: Intact to LT and PP no extinction    Coordination: FTN intact b/l                                             GENERAL Exam:     Nontoxic , No Acute Distress   	  HEENT:  normocephalic, atraumatic  		  LUNGS:	Clear bilaterally  No Wheeze    	  HEART:	 Normal S1S2   No murmur RRR        	  GI/ ABDOMEN:  Soft  Non tender    EXTREMITIES:   No Edema  No Clubbing  No Cyanosis No Edema    MUSCULOSKELETAL: Normal Range of Motion  	   SKIN:      Normal   No Ecchymosis               LABS:  CBC Full  -  ( 29 May 2025 23:48 )  WBC Count : 4.08 K/uL  RBC Count : 5.32 M/uL  Hemoglobin : 14.0 g/dL  Hematocrit : 43.5 %  Platelet Count - Automated : 195 K/uL  Mean Cell Volume : 81.8 fl  Mean Cell Hemoglobin : 26.3 pg  Mean Cell Hemoglobin Concentration : 32.2 g/dL  Auto Neutrophil # : x  Auto Lymphocyte # : x  Auto Monocyte # : x  Auto Eosinophil # : x  Auto Basophil # : x  Auto Neutrophil % : x  Auto Lymphocyte % : x  Auto Monocyte % : x  Auto Eosinophil % : x  Auto Basophil % : x    Urinalysis Basic - ( 29 May 2025 23:48 )    Color: x / Appearance: x / SG: x / pH: x  Gluc: 134 mg/dL / Ketone: x  / Bili: x / Urobili: x   Blood: x / Protein: x / Nitrite: x   Leuk Esterase: x / RBC: x / WBC x   Sq Epi: x / Non Sq Epi: x / Bacteria: x      05-29    140  |  107  |  15  ----------------------------<  134[H]  3.7   |  27  |  1.19    Ca    8.7      29 May 2025 23:48    TPro  7.4  /  Alb  3.9  /  TBili  0.6  /  DBili  x   /  AST  27  /  ALT  64  /  AlkPhos  91  05-29    LIVER FUNCTIONS - ( 29 May 2025 23:48 )  Alb: 3.9 g/dL / Pro: 7.4 gm/dL / ALK PHOS: 91 U/L / ALT: 64 U/L / AST: 27 U/L / GGT: x           Hemoglobin A1C:       PT/INR - ( 29 May 2025 23:48 )   PT: 12.1 sec;   INR: 1.04 ratio         PTT - ( 29 May 2025 23:48 )  PTT:28.5 sec      RADIOLOGY  < from: CT Angio Brain Stroke Protocol  w/ IV Cont (05.30.25 @ 00:57) >  IMPRESSION:    CT PERFUSION:  Technical limitations: Motion.    Core infarction: 0 ml  Penumbra / tissue at risk for active ischemia: 18 ml, scattered   bilaterally in a nonvascular distribution, and may be artifactual.    CTA NECK:  No evidence of significant stenosis or occlusion.    CTA HEAD:  No large vessel occlusion, significant stenosis or vascular abnormality   identified.      < end of copied text >  < from: MR Head No Cont (05.13.25 @ 13:14) >  IMPRESSION: No acute infarct, hemorrhage, or mass effect.    --- End of Report ---      < end of copied text >

## 2025-05-30 NOTE — PHYSICAL THERAPY INITIAL EVALUATION ADULT - PERTINENT HX OF CURRENT PROBLEM, REHAB EVAL
Pt's wife Kendra stated pt has this stroke symptoms twice, one in May 12 and the 2nd in May 29 with c/o weakness in the left extremities, but did not affect his ambulation balance significantly, continues to ambulate, denies previous and recent falls.

## 2025-05-30 NOTE — PHYSICAL THERAPY INITIAL EVALUATION ADULT - STRENGTHENING, PT EVAL
Improve strength in the UE and LE to 5/5, improve general endurance to good  and be able to perform functional tasks-bed mobility, sitting, standing, transfers and ambulate in a safe manner with or without  assistive device and prevent falls.

## 2025-05-30 NOTE — OCCUPATIONAL THERAPY INITIAL EVALUATION ADULT - GENERAL OBSERVATIONS, REHAB EVAL
Pt was encountered supine in bed with pts wife Kendra and pts son Lobo present; NAD, portable telemetry +, AXOX4, followed commands, cooperative; pt had no c/o pain. OT performed initial neurological evaluation to determine focal neurological deficts. Results posted in long form in EMR. Pt is Vietnamese speaking and requires Xuba  service. Pts wife Kendra present to interpret as needed.

## 2025-05-30 NOTE — PHYSICAL THERAPY INITIAL EVALUATION ADULT - GENERAL OBSERVATIONS, REHAB EVAL
Pt is alert, oriented x 4, wife interprets in Slovenian, pt on room air,  denies pain, c/o occassional tremors and feeling weakness in the LUE and LLE.

## 2025-05-30 NOTE — SWALLOW BEDSIDE ASSESSMENT ADULT - COMMENTS
pt seen bedside seated upright on RA, alert and oriented x4. pt responded to questions for assessment with good accuracy, verbalized his wants/needs and followed simple 1 step directives. Wife and son present at the bedside.     CT brain stroke 5/30/25 IMPRESSION: No evidence of acute intracranial pathology. If clinical symptoms persist or worsen, more sensitive evaluation with brain MRI may be obtained, if no contraindications exist.

## 2025-05-30 NOTE — PATIENT PROFILE ADULT - FALL HARM RISK - UNIVERSAL INTERVENTIONS
Bed in lowest position, wheels locked, appropriate side rails in place/Call bell, personal items and telephone in reach/Instruct patient to call for assistance before getting out of bed or chair/Non-slip footwear when patient is out of bed/[place] to call system/Physically safe environment - no spills, clutter or unnecessary equipment/Purposeful Proactive Rounding/Room/bathroom lighting operational, light cord in reach

## 2025-05-31 LAB
50/50 COAG PANEL: SIGNIFICANT CHANGE UP
ANION GAP SERPL CALC-SCNC: 1 MMOL/L — LOW (ref 5–17)
B2 GLYCOPROT1 IGA SER QL: <2 U/ML — SIGNIFICANT CHANGE UP
B2 GLYCOPROT1 IGG SER-ACNC: <1.4 U/ML — SIGNIFICANT CHANGE UP
B2 GLYCOPROT1 IGM SER-ACNC: <1.5 U/ML — SIGNIFICANT CHANGE UP
BUN SERPL-MCNC: 14 MG/DL — SIGNIFICANT CHANGE UP (ref 7–23)
CALCIUM SERPL-MCNC: 8.6 MG/DL — SIGNIFICANT CHANGE UP (ref 8.5–10.1)
CARDIOLIPIN IGM SER-MCNC: <1.5 MPL U/ML — SIGNIFICANT CHANGE UP
CARDIOLIPIN IGM SER-MCNC: <1.6 GPL U/ML — SIGNIFICANT CHANGE UP
CHLORIDE SERPL-SCNC: 108 MMOL/L — SIGNIFICANT CHANGE UP (ref 96–108)
CO2 SERPL-SCNC: 28 MMOL/L — SIGNIFICANT CHANGE UP (ref 22–31)
CREAT SERPL-MCNC: 1.14 MG/DL — SIGNIFICANT CHANGE UP (ref 0.5–1.3)
CRP SERPL-MCNC: 4 MG/L — SIGNIFICANT CHANGE UP
DEPRECATED CARDIOLIPIN IGA SER: <2 APL U/ML — SIGNIFICANT CHANGE UP
EGFR: 79 ML/MIN/1.73M2 — SIGNIFICANT CHANGE UP
EGFR: 79 ML/MIN/1.73M2 — SIGNIFICANT CHANGE UP
FERRITIN SERPL-MCNC: 830 NG/ML — HIGH (ref 30–400)
GLUCOSE SERPL-MCNC: 151 MG/DL — HIGH (ref 70–99)
HCT VFR BLD CALC: 41.2 % — SIGNIFICANT CHANGE UP (ref 39–50)
HGB BLD-MCNC: 13.1 G/DL — SIGNIFICANT CHANGE UP (ref 13–17)
MCHC RBC-ENTMCNC: 26.2 PG — LOW (ref 27–34)
MCHC RBC-ENTMCNC: 31.8 G/DL — LOW (ref 32–36)
MCV RBC AUTO: 82.4 FL — SIGNIFICANT CHANGE UP (ref 80–100)
NRBC BLD AUTO-RTO: 0 /100 WBCS — SIGNIFICANT CHANGE UP (ref 0–0)
PLATELET # BLD AUTO: 193 K/UL — SIGNIFICANT CHANGE UP (ref 150–400)
POTASSIUM SERPL-MCNC: 3.5 MMOL/L — SIGNIFICANT CHANGE UP (ref 3.5–5.3)
POTASSIUM SERPL-SCNC: 3.5 MMOL/L — SIGNIFICANT CHANGE UP (ref 3.5–5.3)
PROT SERPL-MCNC: 6.8 G/DL — SIGNIFICANT CHANGE UP (ref 6–8.3)
RBC # BLD: 5 M/UL — SIGNIFICANT CHANGE UP (ref 4.2–5.8)
RBC # FLD: 15.6 % — HIGH (ref 10.3–14.5)
RHEUMATOID FACT SERPL-ACNC: <10 IU/ML — SIGNIFICANT CHANGE UP (ref 0–13)
SODIUM SERPL-SCNC: 137 MMOL/L — SIGNIFICANT CHANGE UP (ref 135–145)
T PALLIDUM AB TITR SER: NEGATIVE — SIGNIFICANT CHANGE UP
THROMBIN TIME: 13.2 SEC — SIGNIFICANT CHANGE UP (ref 11–16.4)
WBC # BLD: 3.85 K/UL — SIGNIFICANT CHANGE UP (ref 3.8–10.5)
WBC # FLD AUTO: 3.85 K/UL — SIGNIFICANT CHANGE UP (ref 3.8–10.5)

## 2025-05-31 PROCEDURE — 99232 SBSQ HOSP IP/OBS MODERATE 35: CPT

## 2025-05-31 RX ORDER — ACETAMINOPHEN 500 MG/5ML
650 LIQUID (ML) ORAL ONCE
Refills: 0 | Status: COMPLETED | OUTPATIENT
Start: 2025-05-31 | End: 2025-05-31

## 2025-05-31 RX ADMIN — Medication 81 MILLIGRAM(S): at 11:40

## 2025-05-31 RX ADMIN — Medication 650 MILLIGRAM(S): at 09:00

## 2025-05-31 RX ADMIN — ATORVASTATIN CALCIUM 80 MILLIGRAM(S): 80 TABLET, FILM COATED ORAL at 21:44

## 2025-05-31 RX ADMIN — Medication 40 MILLIGRAM(S): at 06:10

## 2025-05-31 RX ADMIN — Medication 1 DROP(S): at 06:11

## 2025-05-31 RX ADMIN — CLOPIDOGREL BISULFATE 75 MILLIGRAM(S): 75 TABLET, FILM COATED ORAL at 11:39

## 2025-05-31 RX ADMIN — ENOXAPARIN SODIUM 40 MILLIGRAM(S): 100 INJECTION SUBCUTANEOUS at 17:10

## 2025-05-31 RX ADMIN — Medication 1 DROP(S): at 17:10

## 2025-05-31 RX ADMIN — Medication 10 MILLIGRAM(S): at 11:39

## 2025-05-31 RX ADMIN — Medication 650 MILLIGRAM(S): at 10:00

## 2025-06-01 VITALS
DIASTOLIC BLOOD PRESSURE: 74 MMHG | SYSTOLIC BLOOD PRESSURE: 110 MMHG | HEART RATE: 87 BPM | TEMPERATURE: 98 F | OXYGEN SATURATION: 96 % | RESPIRATION RATE: 16 BRPM

## 2025-06-01 DIAGNOSIS — G45.9 TRANSIENT CEREBRAL ISCHEMIC ATTACK, UNSPECIFIED: ICD-10-CM

## 2025-06-01 PROCEDURE — 99239 HOSP IP/OBS DSCHRG MGMT >30: CPT

## 2025-06-01 RX ORDER — LANOLIN/MINERAL OIL/PETROLATUM
1 OINTMENT (GRAM) OPHTHALMIC (EYE)
Qty: 0 | Refills: 0 | DISCHARGE
Start: 2025-06-01

## 2025-06-01 RX ORDER — CLOPIDOGREL BISULFATE 75 MG/1
1 TABLET, FILM COATED ORAL
Qty: 21 | Refills: 0
Start: 2025-06-01 | End: 2025-06-21

## 2025-06-01 RX ADMIN — Medication 40 MILLIGRAM(S): at 06:14

## 2025-06-01 RX ADMIN — Medication 10 MILLIGRAM(S): at 11:37

## 2025-06-01 RX ADMIN — Medication 81 MILLIGRAM(S): at 11:37

## 2025-06-01 RX ADMIN — CLOPIDOGREL BISULFATE 75 MILLIGRAM(S): 75 TABLET, FILM COATED ORAL at 11:37

## 2025-06-01 RX ADMIN — Medication 1 DROP(S): at 06:14

## 2025-06-01 NOTE — DISCHARGE NOTE NURSING/CASE MANAGEMENT/SOCIAL WORK - NSDCFUADDAPPT_GEN_ALL_CORE_FT
Call to make an appointment with Dr. Camarillo, Neurology, for follow up and review hospital course. Follow up hypercoagable workup done in hospital and outpatient EEG    Call to make an appointment with your cardiologist for follow up

## 2025-06-01 NOTE — DISCHARGE NOTE NURSING/CASE MANAGEMENT/SOCIAL WORK - FINANCIAL ASSISTANCE
[facility] provides services at a reduced cost to those who are determined to be eligible through [facility]’s financial assistance program. Information regarding [facility]’s financial assistance program can be found by going to [email or web address] or by calling [phone].

## 2025-06-01 NOTE — DISCHARGE NOTE PROVIDER - NSDCFUADDAPPT_GEN_ALL_CORE_FT
Call to make an appointment with Dr. Camarillo, Neurology, for follow up and review hospital course. Follow up hypercoagable workup done in hospital and outpatient EEG    Call to make an appointment with your cardiologist for follow up Call to make an appointment with Dr. Camarillo, Neurology, for follow up and review hospital course. Follow up hypercoagable workup done in hospital and outpatient EEG    Call to make an appointment with your cardiologist for follow up      Patient informed us they already have secured a follow up appointment which was not scheduled by our team.  Spoke with patients wife Kendra,  pt has seen Gabriela Sarah  (MD) for HFU. No scheduling assistance needed.

## 2025-06-01 NOTE — PROGRESS NOTE ADULT - ASSESSMENT
49-year-old male with hx of history of TIA 16 days ago presents today with symptoms of slurred speech and left-sided facial drooping, admitted for TIA    #TIA  Patient presented with symptoms of left-sided facial drooping, slurred speech, and right hand weakness. All imaging completed and negative. Patient had TIA episode 16 days ago and was admitted but workup was negative. .   - sxms now resolved  - ASA & plavix  - Statin  - TTE reviewed, no cardiac source  - Neurology consulted  - PT/OT/SLP: outpt PT  - Neuro checks q4 hrs  - HgbA1c, lipid panel reviewed  - Hypercoag workup ordered, f/u outpt with neurology to review results, outpt eeg        
49-year-old male with hx of history of TIA 16 days ago presents today with symptoms of slurred speech and left-sided facial drooping, admitted for TIA    #TIA  Patient presents with symptoms of left-sided facial drooping, slurred speech, and right hand weakness. All imaging completed and negative. Patient had TIA episode 16 days ago and was admitted but workup was negative. .   - sxms now resolved  - ASA & plavix  - Statin  - Neurology consulted  - PT/OT/SLP  - Neuro checks q4 hrs  - HgbA1c, lipid panel reviewed  - Echocardiogram with bubble study ordered  - Hypercoag workup ordered        
49-year-old male with hx of history of TIA 16 days ago presents today with symptoms of slurred speech and left-sided facial drooping, admitted for TIA    #TIA  Patient presents with symptoms of left-sided facial drooping, slurred speech, and right hand weakness. All imaging completed and negative. Patient had TIA episode 16 days ago and was admitted but workup was negative. .   - sxms now resolved  - ASA & plavix  - Statin  - Neurology consulted  - PT/OT/SLP: outpt PT  - Neuro checks q4 hrs  - HgbA1c, lipid panel reviewed  - Echocardiogram with bubble study ordered  - Hypercoag workup ordered        
49-year-old male with hx of history of TIA 16 days ago presents today with symptoms of slurred speech and left-sided facial drooping, resolved. Similar event 2 weeks kevin, MRI then nomral. Exam non-fcoal NIHSS 0  CTH CTA CTP wnl  a1c 5.5, ldl 73      ?TIA vs focal seizures    On Aspirin 81 and Plavix 75, ok to continue for additional 3 weeks with aspirin monotherapy afterwards  maintain statin  TTE rev, no cardiac source  hypercoagulable labs - f.u as OP  outpatient eeg  DVT prophylaxis, Neurochecks  normotensive BP  neurochecks q4h  PT/OT/Speech and swallow/safety eval.  f/U dr. Camarillo  no further neuro workup indicated at this time, dc planning    Yadira Moseley DO  Vascular Neurology  Office [phone]  Available via Microsoft Teams

## 2025-06-01 NOTE — DISCHARGE NOTE PROVIDER - CARE PROVIDER_API CALL
Gabriela Gerard  Internal Medicine  [address] [place] [id number]  Phone: [phone]  Fax: [phone]  Follow Up Time: 1 week

## 2025-06-01 NOTE — DISCHARGE NOTE PROVIDER - HOSPITAL COURSE
HPI:  49-year-old male with hx of history of TIA 16 days ago presents today with symptoms of slurred speech and left-sided facial drooping. Patient noticed that around 10:30 PM, he developed blurriness in the right eye. Wife states that shortly after he began stuttering when talking to her. He also developed left-sided facial droop. Wife states that he appeared to have right-handed weakness. She also states he was cramping the fingers in his left hand. He denies numbness, chest pain, SOB, headache. Patient does report dizziness. All other symptoms are now resolved.     Patient had similar episode 16 days ago and workup was done with negative findings. Patient was sent for outpatient echocardiogram at the time. Patient states he wishes to have echocardiogram done inpatient.  (30 May 2025 11:21)      Hospital Course:    49-year-old male with hx of history of TIA 16 days ago presents today with symptoms of slurred speech and left-sided facial drooping, admitted for TIA    #TIA  Patient presented with symptoms of left-sided facial drooping, slurred speech, and right hand weakness. All imaging completed and negative. Patient had TIA episode 16 days ago and was admitted but workup was negative. .   - sxms now resolved  - ASA & plavix  - Statin  - TTE reviewed, no cardiac source  - Neurology consulted  - PT/OT/SLP: outpt PT  - Neuro checks q4 hrs  - HgbA1c, lipid panel reviewed  - Hypercoag workup ordered, f/u outpt with neurology to review results, outpt eeg  - cardiac monitor in place, follow up with cardiology outpt

## 2025-06-01 NOTE — DISCHARGE NOTE NURSING/CASE MANAGEMENT/SOCIAL WORK - PATIENT PORTAL LINK FT
You can access the FollowMyHealth Patient Portal offered by [facility] by registering at the following website: [email or web address]. By joining [facility]’s FollowMyHealth portal, you will also be able to view your health information using other applications (apps) compatible with our system.

## 2025-06-01 NOTE — DISCHARGE NOTE PROVIDER - NSDCCPCAREPLAN_GEN_ALL_CORE_FT
PRINCIPAL DISCHARGE DIAGNOSIS  Diagnosis: TIA on medication  Assessment and Plan of Treatment: Continue with aspirin  Continue with plavix for additional 3 weeks. prescription sent to pharmacy  Follow with cardiology and neurology as planned

## 2025-06-01 NOTE — DISCHARGE NOTE PROVIDER - CARE PROVIDERS DIRECT ADDRESSES
[email or web address]

## 2025-06-01 NOTE — DISCHARGE NOTE PROVIDER - NSDCMRMEDTOKEN_GEN_ALL_CORE_FT
aspirin 81 mg oral delayed release tablet: 1 tab(s) orally once a day  cetirizine 10 mg oral tablet: 1 tab(s) orally once a day  Lipitor 40 mg oral tablet: 1 tab(s) orally once a day (at bedtime)  ocular lubricant preservative-free ophthalmic solution: 1 drop(s) to each affected eye 2 times a day  pantoprazole 40 mg oral delayed release tablet: 1 tab(s) orally once a day (before a meal)  Plavix 75 mg oral tablet: 1 tab(s) orally once a day   aspirin 81 mg oral delayed release tablet: 1 tab(s) orally once a day  cetirizine 10 mg oral tablet: 1 tab(s) orally once a day  Lipitor 40 mg oral tablet: 1 tab(s) orally once a day (at bedtime)  ocular lubricant preservative-free ophthalmic solution: 1 drop(s) to each affected eye 2 times a day  pantoprazole 40 mg oral delayed release tablet: 1 tab(s) orally once a day (before a meal)  Physical Therapy: 3 x week/ Dx TIA  Plavix 75 mg oral tablet: 1 tab(s) orally once a day

## 2025-06-01 NOTE — PROGRESS NOTE ADULT - SUBJECTIVE AND OBJECTIVE BOX
PROGRESS NOTE:     Patient is a 49y old  Male who presents with a chief complaint of TIA (30 May 2025 12:50)          SUBJECTIVE & OBJECTIVE:   Pt seen and examined at bedside in AM    no overnight events.       REVIEW OF SYSTEMS: remaining ROS negative     PHYSICAL EXAM:  VITALS:  Vital Signs Last 24 Hrs  T(C): 36.8 (30 May 2025 12:40), Max: 37.2 (29 May 2025 23:29)  T(F): 98.2 (30 May 2025 12:40), Max: 99 (29 May 2025 23:29)  HR: 91 (30 May 2025 12:40) (71 - 93)  BP: 118/81 (30 May 2025 12:40) (102/75 - 134/86)  BP(mean): 90 (30 May 2025 05:30) (86 - 90)  RR: 17 (30 May 2025 12:40) (12 - 18)  SpO2: 95% (30 May 2025 12:40) (95% - 100%)    Parameters below as of 30 May 2025 12:40  Patient On (Oxygen Delivery Method): room air          GENERAL: NAD,  no increased WOB  HEAD:  Atraumatic, Normocephalic  EYES: EOMI, PERRLA, conjunctiva and sclera clear  ENMT: Moist mucous membranes  NECK: Supple, No JVD  NERVOUS SYSTEM:  Alert & Oriented  CHEST/LUNG: Clear to auscultation bilaterally; No rales, rhonchi, wheezing  HEART: Regular rate and rhythm  ABDOMEN: Soft, Nontender, Nondistended; Bowel sounds present  EXTREMITIES:  No clubbing, cyanosis, calf tenderness or edema b/l      MEDICATIONS  (STANDING):  artificial  tears Solution 1 Drop(s) Both EYES two times a day  aspirin enteric coated 81 milliGRAM(s) Oral daily  atorvastatin 80 milliGRAM(s) Oral at bedtime  cetirizine 10 milliGRAM(s) Oral daily  clopidogrel Tablet 75 milliGRAM(s) Oral daily  ketotifen 0.025% Ophthalmic Solution 1 Drop(s) Both EYES two times a day    MEDICATIONS  (PRN):      Allergies    No Known Allergies    Intolerances              LABS:                           14.0   4.08  )-----------( 195      ( 29 May 2025 23:48 )             43.5     05-29    140  |  107  |  15  ----------------------------<  134[H]  3.7   |  27  |  1.19    Ca    8.7      29 May 2025 23:48    TPro  7.4  /  Alb  3.9  /  TBili  0.6  /  DBili  x   /  AST  27  /  ALT  64  /  AlkPhos  91  05-29    PT/INR - ( 29 May 2025 23:48 )   PT: 12.1 sec;   INR: 1.04 ratio         PTT - ( 29 May 2025 23:48 )  PTT:28.5 sec  Urinalysis Basic - ( 29 May 2025 23:48 )    Color: x / Appearance: x / SG: x / pH: x  Gluc: 134 mg/dL / Ketone: x  / Bili: x / Urobili: x   Blood: x / Protein: x / Nitrite: x   Leuk Esterase: x / RBC: x / WBC x   Sq Epi: x / Non Sq Epi: x / Bacteria: x      CAPILLARY BLOOD GLUCOSE      POCT Blood Glucose.: 146 mg/dL (29 May 2025 23:27)                RECENT CULTURES:          RADIOLOGY & ADDITIONAL TESTS:    
Neurology Progress Note    S: Patient seen and examined. No new events overnight. patient denies HA or pain.     Medication:  artificial  tears Solution 1 Drop(s) Both EYES two times a day  aspirin enteric coated 81 milliGRAM(s) Oral daily  atorvastatin 80 milliGRAM(s) Oral at bedtime  cetirizine 10 milliGRAM(s) Oral daily  clopidogrel Tablet 75 milliGRAM(s) Oral daily  enoxaparin Injectable 40 milliGRAM(s) SubCutaneous every 24 hours  ketotifen 0.025% Ophthalmic Solution 1 Drop(s) Both EYES two times a day  pantoprazole    Tablet 40 milliGRAM(s) Oral before breakfast      Vitals:  Vital Signs Last 24 Hrs  T(C): 36.8 (01 Jun 2025 05:00), Max: 37.3 (31 May 2025 23:19)  T(F): 98.3 (01 Jun 2025 05:00), Max: 99.1 (31 May 2025 23:19)  HR: 95 (01 Jun 2025 05:00) (67 - 95)  BP: 108/73 (01 Jun 2025 05:00) (105/70 - 119/75)  BP(mean): --  RR: 17 (01 Jun 2025 05:00) (16 - 18)  SpO2: 96% (01 Jun 2025 05:00) (95% - 96%)    Parameters below as of 31 May 2025 11:08  Patient On (Oxygen Delivery Method): room air        General Exam:   General Appearance: Appropriately dressed and in no acute distress       Head: Normocephalic, atraumatic and no dysmorphic features  Ear, Nose, and Throat: Moist mucous membranes  CVS: S1S2+  Resp: No SOB, no wheeze or rhonchi  Abd: soft NTND  Extremities: No edema, no cyanosis  Skin: No bruises, no rashes       On Neurological Examination:    Mental Status - Patient is alertNeuro: AAOx3; knows age, month, obeys commands, no dysarthria; calculations intact, fluent names, repeats     CN: PERRL, EOMI, VFF normal gaze preference, no facial palsy,     Motor: no drift in all extremeties    Sensory: Intact to LT and PP no extinction    Coordination: FTN intact b/l                                                 I personally reviewed the below data/images/labs:      CBC Full  -  ( 31 May 2025 10:28 )  WBC Count : 3.85 K/uL  RBC Count : 5.00 M/uL  Hemoglobin : 13.1 g/dL  Hematocrit : 41.2 %  Platelet Count - Automated : 193 K/uL  Mean Cell Volume : 82.4 fl  Mean Cell Hemoglobin : 26.2 pg  Mean Cell Hemoglobin Concentration : 31.8 g/dL  Auto Neutrophil # : x  Auto Lymphocyte # : x  Auto Monocyte # : x  Auto Eosinophil # : x  Auto Basophil # : x  Auto Neutrophil % : x  Auto Lymphocyte % : x  Auto Monocyte % : xn  Auto Eosinophil % : x  Auto Basophil % : x    05-31    137  |  108  |  14  ----------------------------<  151[H]  3.5   |  28  |  1.14    Ca    8.6      31 May 2025 10:28    TPro  6.8  /  Alb  x   /  TBili  x   /  DBili  x   /  AST  x   /  ALT  x   /  AlkPhos  x   05-30    LIVER FUNCTIONS - ( 30 May 2025 17:05 )  Alb: x     / Pro: 6.8 g/dL / ALK PHOS: x     / ALT: x     / AST: x     / GGT: x           PT/INR - ( 30 May 2025 18:43 )   PT: 11.9 sec;   INR: 1.05 ratio         PTT - ( 30 May 2025 18:43 )  PTT:30.3 sec  Urinalysis Basic - ( 31 May 2025 10:28 )    Color: x / Appearance: x / SG: x / pH: x  Gluc: 151 mg/dL / Ketone: x  / Bili: x / Urobili: x   Blood: x / Protein: x / Nitrite: x   Leuk Esterase: x / RBC: x / WBC x   Sq Epi: x / Non Sq Epi: x / Bacteria: x    < from: CT Angio Brain Stroke Protocol  w/ IV Cont (05.30.25 @ 00:57) >  IMPRESSION:    CT PERFUSION:  Technical limitations: Motion.    Core infarction: 0 ml  Penumbra / tissue at risk for active ischemia: 18 ml, scattered   bilaterally in a nonvascular distribution, and may be artifactual.    CTA NECK:  No evidence of significant stenosis or occlusion.    CTA HEAD:  No large vessel occlusion, significant stenosis or vascular abnormality   identified.      < end of copied text >  < from: MR Head No Cont (05.13.25 @ 13:14) >  IMPRESSION: No acute infarct, hemorrhage, or mass effect.    --- End of Report ---      < end of copied text >                CONCLUSIONS:     1. Left ventricular systolic function is normal with an ejection   fraction of 61 % by Chicas's method of disks.   2. No evidence of an intracardiac shunt.   3. Mild tricuspid regurgitation.      
PROGRESS NOTE:     Patient is a 49y old  Male who presents with a chief complaint of TIA (30 May 2025 12:50)          SUBJECTIVE & OBJECTIVE:   Pt seen and examined at bedside in AM. asxm this AM    no overnight events.       REVIEW OF SYSTEMS: remaining ROS negative     PHYSICAL EXAM:  VITALS:  Vital Signs Last 24 Hrs  T(C): 37 (31 May 2025 10:47), Max: 37 (30 May 2025 20:52)  T(F): 98.6 (31 May 2025 10:47), Max: 98.6 (30 May 2025 20:52)  HR: 87 (31 May 2025 11:08) (63 - 100)  BP: 105/70 (31 May 2025 11:08) (105/70 - 110/67)  BP(mean): --  RR: 17 (31 May 2025 11:08) (17 - 18)  SpO2: 95% (31 May 2025 11:08) (92% - 96%)    Parameters below as of 31 May 2025 11:08  Patient On (Oxygen Delivery Method): room air        GENERAL: NAD,  no increased WOB  HEAD:  Atraumatic, Normocephalic  EYES: EOMI, PERRLA, conjunctiva and sclera clear  ENMT: Moist mucous membranes  NECK: Supple, No JVD  NERVOUS SYSTEM:  Alert & Oriented  CHEST/LUNG: Clear to auscultation bilaterally; No rales, rhonchi, wheezing  HEART: Regular rate and rhythm  ABDOMEN: Soft, Nontender, Nondistended; Bowel sounds present  EXTREMITIES:  No clubbing, cyanosis, calf tenderness or edema b/l      MEDICATIONS  (STANDING):  artificial  tears Solution 1 Drop(s) Both EYES two times a day  aspirin enteric coated 81 milliGRAM(s) Oral daily  atorvastatin 80 milliGRAM(s) Oral at bedtime  cetirizine 10 milliGRAM(s) Oral daily  clopidogrel Tablet 75 milliGRAM(s) Oral daily  enoxaparin Injectable 40 milliGRAM(s) SubCutaneous every 24 hours  ketotifen 0.025% Ophthalmic Solution 1 Drop(s) Both EYES two times a day  pantoprazole    Tablet 40 milliGRAM(s) Oral before breakfast    MEDICATIONS  (PRN):        Allergies    No Known Allergies    Intolerances              LABS:                                      13.1   3.85  )-----------( 193      ( 31 May 2025 10:28 )             41.2     05-31    137  |  108  |  14  ----------------------------<  151[H]  3.5   |  28  |  1.14    Ca    8.6      31 May 2025 10:28    TPro  6.8  /  Alb  x   /  TBili  x   /  DBili  x   /  AST  x   /  ALT  x   /  AlkPhos  x   05-30    LIVER FUNCTIONS - ( 30 May 2025 17:05 )  Alb: x     / Pro: 6.8 g/dL / ALK PHOS: x     / ALT: x     / AST: x     / GGT: x           PT/INR - ( 30 May 2025 18:43 )   PT: 11.9 sec;   INR: 1.05 ratio         PTT - ( 30 May 2025 18:43 )  PTT:30.3 sec  Urinalysis Basic - ( 31 May 2025 10:28 )    Color: x / Appearance: x / SG: x / pH: x  Gluc: 151 mg/dL / Ketone: x  / Bili: x / Urobili: x   Blood: x / Protein: x / Nitrite: x   Leuk Esterase: x / RBC: x / WBC x   Sq Epi: x / Non Sq Epi: x / Bacteria: x        CAPILLARY BLOOD GLUCOSE      POCT Blood Glucose.: 146 mg/dL (29 May 2025 23:27)                RECENT CULTURES:          RADIOLOGY & ADDITIONAL TESTS:    
PROGRESS NOTE:     Patient is a 49y old  Male who presents with a chief complaint of TIA (30 May 2025 12:50)          SUBJECTIVE & OBJECTIVE:   Pt seen and examined at bedside in AM. symptoms have resolved since hospitalized   no overnight events.       REVIEW OF SYSTEMS: remaining ROS negative     PHYSICAL EXAM:  VITALS:  Vital Signs Last 24 Hrs  T(C): 36.4 (01 Jun 2025 10:20), Max: 37.3 (31 May 2025 23:19)  T(F): 97.6 (01 Jun 2025 10:20), Max: 99.1 (31 May 2025 23:19)  HR: 87 (01 Jun 2025 10:20) (67 - 95)  BP: 110/74 (01 Jun 2025 10:20) (105/70 - 119/75)  BP(mean): --  RR: 16 (01 Jun 2025 10:20) (16 - 18)  SpO2: 96% (01 Jun 2025 10:20) (95% - 96%)    Parameters below as of 31 May 2025 11:08  Patient On (Oxygen Delivery Method): room air      GENERAL: NAD,  no increased WOB  HEAD:  Atraumatic, Normocephalic  EYES: EOMI, PERRLA, conjunctiva and sclera clear  ENMT: Moist mucous membranes  NECK: Supple, No JVD  NERVOUS SYSTEM:  Alert & Oriented  CHEST/LUNG: Clear to auscultation bilaterally; No rales, rhonchi, wheezing  HEART: Regular rate and rhythm  ABDOMEN: Soft, Nontender, Nondistended; Bowel sounds present  EXTREMITIES:  No clubbing, cyanosis, calf tenderness or edema b/l      MEDICATIONS  (STANDING):  artificial  tears Solution 1 Drop(s) Both EYES two times a day  aspirin enteric coated 81 milliGRAM(s) Oral daily  atorvastatin 80 milliGRAM(s) Oral at bedtime  cetirizine 10 milliGRAM(s) Oral daily  clopidogrel Tablet 75 milliGRAM(s) Oral daily  enoxaparin Injectable 40 milliGRAM(s) SubCutaneous every 24 hours  ketotifen 0.025% Ophthalmic Solution 1 Drop(s) Both EYES two times a day  pantoprazole    Tablet 40 milliGRAM(s) Oral before breakfast    MEDICATIONS  (PRN):          Allergies    No Known Allergies    Intolerances              LABS:                                      13.1   3.85  )-----------( 193      ( 31 May 2025 10:28 )             41.2     05-31    137  |  108  |  14  ----------------------------<  151[H]  3.5   |  28  |  1.14    Ca    8.6      31 May 2025 10:28    TPro  6.8  /  Alb  x   /  TBili  x   /  DBili  x   /  AST  x   /  ALT  x   /  AlkPhos  x   05-30    LIVER FUNCTIONS - ( 30 May 2025 17:05 )  Alb: x     / Pro: 6.8 g/dL / ALK PHOS: x     / ALT: x     / AST: x     / GGT: x           PT/INR - ( 30 May 2025 18:43 )   PT: 11.9 sec;   INR: 1.05 ratio         PTT - ( 30 May 2025 18:43 )  PTT:30.3 sec  Urinalysis Basic - ( 31 May 2025 10:28 )    Color: x / Appearance: x / SG: x / pH: x  Gluc: 151 mg/dL / Ketone: x  / Bili: x / Urobili: x   Blood: x / Protein: x / Nitrite: x   Leuk Esterase: x / RBC: x / WBC x   Sq Epi: x / Non Sq Epi: x / Bacteria: x        CAPILLARY BLOOD GLUCOSE      POCT Blood Glucose.: 146 mg/dL (29 May 2025 23:27)                RECENT CULTURES:          RADIOLOGY & ADDITIONAL TESTS:

## 2025-06-02 LAB
% ALBUMIN: 60.6 % — SIGNIFICANT CHANGE UP
% ALPHA 1: 4.1 % — SIGNIFICANT CHANGE UP
% ALPHA 2: 7.6 % — SIGNIFICANT CHANGE UP
% BETA: 11.8 % — SIGNIFICANT CHANGE UP
% GAMMA: 15.9 % — SIGNIFICANT CHANGE UP
ALBUMIN SERPL ELPH-MCNC: 4.1 G/DL — SIGNIFICANT CHANGE UP (ref 3.6–5.5)
ALBUMIN/GLOB SERPL ELPH: 1.5 RATIO — SIGNIFICANT CHANGE UP
ALPHA1 GLOB SERPL ELPH-MCNC: 0.3 G/DL — SIGNIFICANT CHANGE UP (ref 0.1–0.4)
ALPHA2 GLOB SERPL ELPH-MCNC: 0.5 G/DL — SIGNIFICANT CHANGE UP (ref 0.5–1)
ANA TITR SER: NEGATIVE — SIGNIFICANT CHANGE UP
AT III ACT/NOR PPP CHRO: 111 % — SIGNIFICANT CHANGE UP (ref 85–135)
B-GLOBULIN SERPL ELPH-MCNC: 0.8 G/DL — SIGNIFICANT CHANGE UP (ref 0.5–1)
DRVVT RATIO: 1.07 RATIO — SIGNIFICANT CHANGE UP (ref 0–1.21)
DRVVT SCREEN TO CONFIRM RATIO: SIGNIFICANT CHANGE UP
FACT II INHIB PPP-ACNC: 82 % — SIGNIFICANT CHANGE UP (ref 80–135)
FACT V ACT/NOR PPP: 110 % — SIGNIFICANT CHANGE UP (ref 50–150)
GAMMA GLOBULIN: 1.1 G/DL — SIGNIFICANT CHANGE UP (ref 0.6–1.6)
NORMALIZED SCT PPP-RTO: 1.01 RATIO — SIGNIFICANT CHANGE UP (ref 0–1.16)
NORMALIZED SCT PPP-RTO: SIGNIFICANT CHANGE UP
PROT C ACT/NOR PPP: 96 % — SIGNIFICANT CHANGE UP (ref 74–150)
PROT PATTERN SERPL ELPH-IMP: SIGNIFICANT CHANGE UP
PROT S FREE PPP-ACNC: 107 % — SIGNIFICANT CHANGE UP (ref 63–140)
PROT SERPL-MCNC: 6.8 G/DL — SIGNIFICANT CHANGE UP (ref 6–8.3)

## 2025-06-07 DIAGNOSIS — G45.9 TRANSIENT CEREBRAL ISCHEMIC ATTACK, UNSPECIFIED: ICD-10-CM

## 2025-06-07 DIAGNOSIS — Z79.82 LONG TERM (CURRENT) USE OF ASPIRIN: ICD-10-CM

## 2025-06-07 DIAGNOSIS — R29.700 NIHSS SCORE 0: ICD-10-CM

## 2025-06-07 DIAGNOSIS — Z79.02 LONG TERM (CURRENT) USE OF ANTITHROMBOTICS/ANTIPLATELETS: ICD-10-CM

## 2025-06-09 LAB
HOMOCYSTEINE LEVEL: 13.1 UMOL/L — SIGNIFICANT CHANGE UP (ref 0–14.5)
METHYLMALONATE SERPL-SCNC: 190 NMOL/L — SIGNIFICANT CHANGE UP (ref 0–378)